# Patient Record
Sex: MALE | Race: BLACK OR AFRICAN AMERICAN | NOT HISPANIC OR LATINO | Employment: FULL TIME | URBAN - NONMETROPOLITAN AREA
[De-identification: names, ages, dates, MRNs, and addresses within clinical notes are randomized per-mention and may not be internally consistent; named-entity substitution may affect disease eponyms.]

---

## 2017-03-04 ENCOUNTER — COMMUNICATION - GICH (OUTPATIENT)
Dept: FAMILY MEDICINE | Facility: OTHER | Age: 42
End: 2017-03-04

## 2017-03-04 DIAGNOSIS — I10 ESSENTIAL (PRIMARY) HYPERTENSION: ICD-10-CM

## 2017-08-31 ENCOUNTER — COMMUNICATION - GICH (OUTPATIENT)
Dept: FAMILY MEDICINE | Facility: OTHER | Age: 42
End: 2017-08-31

## 2017-08-31 DIAGNOSIS — I10 ESSENTIAL (PRIMARY) HYPERTENSION: ICD-10-CM

## 2017-09-07 ENCOUNTER — COMMUNICATION - GICH (OUTPATIENT)
Dept: FAMILY MEDICINE | Facility: OTHER | Age: 42
End: 2017-09-07

## 2017-09-07 DIAGNOSIS — I10 ESSENTIAL (PRIMARY) HYPERTENSION: ICD-10-CM

## 2017-09-12 ENCOUNTER — COMMUNICATION - GICH (OUTPATIENT)
Dept: FAMILY MEDICINE | Facility: OTHER | Age: 42
End: 2017-09-12

## 2017-09-12 DIAGNOSIS — I10 ESSENTIAL (PRIMARY) HYPERTENSION: ICD-10-CM

## 2017-09-20 ENCOUNTER — OFFICE VISIT - GICH (OUTPATIENT)
Dept: FAMILY MEDICINE | Facility: OTHER | Age: 42
End: 2017-09-20

## 2017-09-20 ENCOUNTER — HISTORY (OUTPATIENT)
Dept: FAMILY MEDICINE | Facility: OTHER | Age: 42
End: 2017-09-20

## 2017-09-20 DIAGNOSIS — E66.3 OVERWEIGHT: ICD-10-CM

## 2017-09-20 DIAGNOSIS — I10 ESSENTIAL (PRIMARY) HYPERTENSION: ICD-10-CM

## 2017-09-20 DIAGNOSIS — Z00.00 ENCOUNTER FOR GENERAL ADULT MEDICAL EXAMINATION WITHOUT ABNORMAL FINDINGS: ICD-10-CM

## 2017-09-20 DIAGNOSIS — Z13.220 ENCOUNTER FOR SCREENING FOR LIPOID DISORDERS: ICD-10-CM

## 2017-09-20 LAB
ANION GAP - HISTORICAL: 10 (ref 5–18)
BUN SERPL-MCNC: 12 MG/DL (ref 7–25)
BUN/CREAT RATIO - HISTORICAL: 11
CALCIUM SERPL-MCNC: 9.8 MG/DL (ref 8.6–10.3)
CHLORIDE SERPLBLD-SCNC: 98 MMOL/L (ref 98–107)
CHOL/HDL RATIO - HISTORICAL: 4.68
CHOLESTEROL TOTAL: 234 MG/DL
CO2 SERPL-SCNC: 28 MMOL/L (ref 21–31)
CREAT SERPL-MCNC: 1.1 MG/DL (ref 0.7–1.3)
GFR IF NOT AFRICAN AMERICAN - HISTORICAL: >60 ML/MIN/1.73M2
GLUCOSE SERPL-MCNC: 107 MG/DL (ref 70–105)
HDLC SERPL-MCNC: 50 MG/DL (ref 23–92)
LDLC SERPL CALC-MCNC: 159 MG/DL
NON-HDL CHOLESTEROL - HISTORICAL: 184 MG/DL
POTASSIUM SERPL-SCNC: 3.6 MMOL/L (ref 3.5–5.1)
PROVIDER ORDERDED STATUS - HISTORICAL: ABNORMAL
SODIUM SERPL-SCNC: 136 MMOL/L (ref 133–143)
TRIGL SERPL-MCNC: 124 MG/DL

## 2017-12-27 NOTE — PROGRESS NOTES
Patient Information     Patient Name MRN Sex Andrews Hernandez [id number] Male 1975      Progress Notes by Bhaskar Kirby MD at 2017  1:00 PM     Author:  Bhaskar Kirby MD Service:  (none) Author Type:  Physician     Filed:  2017  1:35 PM Encounter Date:  2017 Status:  Signed     :  Bhaskar Kirby MD (Physician)            SUBJECTIVE:    Andrews Jansen is a 41 y.o. male who presents for a general physical exam and follow-up on hypertension.    HPI: Patient has a history of hypertension maintained on Zestoretic and amlodipine. He has been working on being more physically active and is starting to lose some weight. He has some intermittent knee pain which he attributes to being overweight otherwise has been feeling well.    PROBLEM LIST:  Patient Active Problem List     Diagnosis  Code     Hypertension I10     Insomnia G47.00     PAST MEDICAL HISTORY:No past medical history on file.  SURGICAL HISTORY:  No past surgical history on file.    SOCIAL HISTORY:  Social History     Social History        Marital status:  Single     Spouse name: N/A     Number of children:  N/A     Years of education:  N/A     Occupational History      Not on file.     Social History Main Topics          Smoking status:   Current Some Day Smoker      Types:  Cigarettes      Smokeless tobacco:   Never Used      Alcohol use   Yes      Comment: rare       Drug use:   No      Sexual activity:   Not on file      Other Topics  Concern     Seat Belt Yes     Social History Narrative     . Two children.  at [employer].             FAMILY HISTORY:  Family History      Problem  Relation Age of Onset     Brain cancer Mother      Hypertension Mother      Unknown Father      Good Health Sister      Congenital heart disease Brother      Good Health Brother      Good Health Brother      Good Health Brother      Good Health Brother      Good Health Brother      Good Health Sister       CURRENT  "MEDICATIONS:   Current Outpatient Prescriptions       Medication  Sig Dispense Refill     amLODIPine (NORVASC) 10 mg tablet Take 1 tablet by mouth once daily. 90 tablet 3     lisinopril-hydrochlorothiazide, 20-25 mg, (PRINZIDE, ZESTORETIC) 20-25 mg per tablet Take 1 tablet by mouth once daily. 90 tablet 3     No current facility-administered medications for this visit.      Medications have been reviewed by me and are current to the best of my knowledge and ability.    ALLERGIES:  Review of patient's allergies indicates no known allergies.    REVIEW OF SYSTEMS:  General: denies any general problems.  Eyes: denies problems  Ears/Nose/Throat: denies problems  Cardiovascular: denies problems  Respiratory: denies problems  Gastrointestinal: denies problems  Genitourinary: denies problems  Musculoskeletal: see HPI  Skin: denies problems  Neurologic: denies problems  Psychiatric: denies problems  Endocrine: denies problems  Heme/Lymphatic: denies problems  Allergic/Immunologic: denies problems  PHQ Depression Screening 9/20/2017   Date of PHQ exam (doc flow) 9/20/2017   1. Lack of interest/pleasure 0 - Not at all   2. Feeling down/depressed 0 - Not at all   PHQ-2 TOTAL SCORE 0   3. Trouble sleeping -   4. Decreased energy -   5. Appetite change -   6. Feelings of failure -   7. Trouble concentrating -   8. Activity level -   9. Hurting yourself -   PHQ-9 TOTAL SCORE -   PHQ-9 Severity Level -   Functional Impairment -   Some recent data might be hidden       OBJECTIVE:  /80  Pulse 68  Ht 1.791 m (5' 10.5\")  Wt 126.3 kg (278 lb 6 oz)  BMI 39.38 kg/m2  EXAM:   General Appearance: Pleasant, alert, appropriate appearance for age. No acute distress  Head Exam: Normal. Normocephalic, atraumatic.  Eye Exam:  Normal external eye, conjunctiva, lids, cornea. TOMMY.  Ear Exam: Normal TM's bilaterally. Normal auditory canals and external ears. Non-tender.  Nose Exam: Normal external nose, mucus membranes, and " septum.  OroPharynx Exam: Patient has had multiple dental extractions with current evidence of ongoing caries.  Neck Exam:  Supple, no masses or nodes.  Thyroid Exam: No nodules or enlargement.  Chest/Respiratory Exam: Normal chest wall and respirations. Clear to auscultation.  Cardiovascular Exam: Regular rate and rhythm. S1, S2, no murmur, click, gallop, or rubs.  Gastrointestinal Exam: Soft, non-tender, no masses or organomegaly.  Genitourinary Exam Male: Normal male genitalia. No discharge or penile ulcerations. No testicular masses or swelling.  Lymphatic Exam: Non-palpable nodes in neck, clavicular, axillary, or inguinal regions.  Musculoskeletal Exam: Back is straight and non-tender, full ROM of upper and lower extremities.  Foot Exam: Left and right foot: good pedal pulses, no lesions, nail hygiene good.  Skin: no rash or abnormalities  Neurologic Exam: Nonfocal, symmetric DTRs, normal gross motor, tone coordination and no tremor.  Psychiatric Exam: Alert and oriented - appropriate affect.    ASSESSMENT/PLAN:    ICD-10-CM    1. Health care maintenance  Immunizations reviewed. Patient believes he had a tetanus booster 6 or 7 years ago.  Z00.00    2. Hypertension  Blood pressure at goal. Continue current medications. Basic metabolic panel drawn today.  I10 amLODIPine (NORVASC) 10 mg tablet      lisinopril-hydrochlorothiazide, 20-25 mg, (PRINZIDE, ZESTORETIC) 20-25 mg per tablet      BASIC METABOLIC PANEL      BASIC METABOLIC PANEL   3. Overweight  Congratulated on efforts to lose weight.  E66.3    4. Lipid screening  Lipid panel drawn today.  Z13.220 LIPID PANEL      LIPID PANEL       Bhaskar Kirby MD

## 2017-12-28 NOTE — TELEPHONE ENCOUNTER
Patient Information     Patient Name MRN Andrews Hernandes [id number] Male 1975      Telephone Encounter by Angelica Tolbert RN at 2017  3:17 PM     Author:  Angelica Tolbert RN Service:  (none) Author Type:  NURS- Registered Nurse     Filed:  2017  3:19 PM Encounter Date:  2017 Status:  Signed     :  Angelica Tolbert RN (NURS- Registered Nurse)            Request already responded to. Patient has upcoming appointment to address additional refills.    Angelica Tolbert RN........2017 3:18 PM

## 2017-12-28 NOTE — TELEPHONE ENCOUNTER
Patient Information     Patient Name MRN Sex nAdrews Hernandez [id number] Male 1975      Telephone Encounter by Angelica Tolbert RN at 2017  2:49 PM     Author:  Angelica Tolbert RN Service:  (none) Author Type:  NURS- Registered Nurse     Filed:  2017  2:57 PM Encounter Date:  2017 Status:  Signed     :  Angelica Tolbert RN (NURS- Registered Nurse)            Patient is overdue for physical and labs. Letter was sent with last refill. Call placed to patient. Left message to call back  ....................Angelica Tolbert RN  2017   2:57 PM

## 2017-12-28 NOTE — TELEPHONE ENCOUNTER
Patient Information     Patient Name MRN Sex Andrews Hernandez [id number] Male 1975      Telephone Encounter by Angelica Tolbert RN at 2017  3:40 PM     Author:  Angelica Tolbert RN Service:  (none) Author Type:  NURS- Registered Nurse     Filed:  2017  3:42 PM Encounter Date:  2017 Status:  Signed     :  Angelica Tolbert RN (NURS- Registered Nurse)            Patient returned phone call and was transferred to scheduling.    Diuretic Combinations    Office visit in the past 12 months or per provider note.    Last visit with ZAID KEATING was on: 2016 in [facility]  Next visit with ZAID KEATING is on: No future appointment listed with this provider  Next visit with Family Practice is on: No future appointment listed in this department    Lab test requirements:  Creatinine and Potassium annually, if ordering lab, order BMP.  CREATININE (mg/dL)    Date Value   2015 0.90     POTASSIUM (mmol/L)    Date Value   2015 4.0       Max refill for 12 months from last office visit or per provider note.    Prescription refilled per RN Medication Refill Policy.................... Angelica oTlbert RN ....................  2017   3:41 PM

## 2017-12-28 NOTE — TELEPHONE ENCOUNTER
Patient Information     Patient Name MRN Andrews Hernandes [id number] Male 1975      Telephone Encounter by Angelica Tolbert RN at 2017 12:23 PM     Author:  Angelica Tolbert RN Service:  (none) Author Type:  NURS- Registered Nurse     Filed:  2017 12:25 PM Encounter Date:  2017 Status:  Signed     :  Angelica Tolbert RN (NURS- Registered Nurse)            Calcium Channel Blockers    Office visit in the past 12 months or per provider note.    Last visit with ZAID KEATING was on: 2016 in [facility]  Next visit with ZAID KEATING is on: 2017 in [facility]  Next visit with Family Practice is on: 2017 in [facility]  BP Readings from Last 4 Encounters:    16 122/78   11/16/15 146/90   08/06/15 146/90   14 136/88       Review last provider visit note.  If BP reviewed and plan is noted, can refill.  Max refill for 12 months from last office visit or per provider note.    Patient is due for medication management appointment. Limited refill provided at this timeand noted upcoming appointment. Prescription refilled per RN Medication Refill Policy.................... Angelica Tolbert RN ....................  2017   12:23 PM

## 2018-01-03 NOTE — TELEPHONE ENCOUNTER
Patient Information     Patient Name MRN Sex Andrews Hernandez [id number] Male 1975      Telephone Encounter by Angelica Tolbert RN at 3/6/2017  8:31 AM     Author:  Angelica Tolbert RN Service:  (none) Author Type:  NURS- Registered Nurse     Filed:  3/6/2017  8:31 AM Encounter Date:  3/4/2017 Status:  Signed     :  Angelica Tolbert RN (NURS- Registered Nurse)            Calcium Channel Blockers    Office visit in the past 12 months or per provider note.    Last visit with ZAID KEATING was on: 2016 in [facility]  Next visit with ZAID KEATING is on: No future appointment listed with this provider  Next visit with Family Practice is on: No future appointment listed in this department  BP Readings from Last 4 Encounters:    16 122/78   11/16/15 146/90   08/06/15 146/90   14 136/88       Review last provider visit note.  If BP reviewed and plan is noted, can refill.  Max refill for 12 months from last office visit or per provider note.    Patient is due for medication management appointment. Limited refill provided at this time and letter sent for reminder to patient. Prescription refilled per RN Medication Refill Policy.................... Angelica Tolbert RN ....................  3/6/2017   8:31 AM

## 2018-01-27 VITALS
BODY MASS INDEX: 38.97 KG/M2 | SYSTOLIC BLOOD PRESSURE: 136 MMHG | HEIGHT: 71 IN | HEART RATE: 68 BPM | WEIGHT: 278.38 LBS | DIASTOLIC BLOOD PRESSURE: 80 MMHG

## 2018-02-16 ENCOUNTER — DOCUMENTATION ONLY (OUTPATIENT)
Dept: FAMILY MEDICINE | Facility: OTHER | Age: 43
End: 2018-02-16

## 2018-02-16 PROBLEM — E66.3 OVERWEIGHT: Status: ACTIVE | Noted: 2017-09-20

## 2018-02-16 RX ORDER — LISINOPRIL AND HYDROCHLOROTHIAZIDE 20; 25 MG/1; MG/1
1 TABLET ORAL DAILY
COMMUNITY
Start: 2017-09-20 | End: 2018-10-18

## 2018-02-16 RX ORDER — AMLODIPINE BESYLATE 10 MG/1
10 TABLET ORAL DAILY
COMMUNITY
Start: 2017-09-20 | End: 2018-10-18

## 2018-07-23 NOTE — PROGRESS NOTES
Patient Information     Patient Name  Andrews Jansen MRN  [id number] Sex  Male   1975      Letter by Bhaskar Kirby MD at      Author:  Bhaskar Kirby MD Service:  (none) Author Type:  (none)    Filed:   Encounter Date:  2017 Status:  (Other)       Andrews Jansen  [address] [facility] [address]    2017      Dear Mr. Jansen,      Please find enclosed a copy of your recent laboratory studies.    Your lipid panel was normal except for a high LDL cholesterol of 159. Your basic metabolic panel showed a slightly elevated blood glucose likely due to not being fasting.      Results for orders placed or performed in visit on 17      LIPID PANEL      Result  Value Ref Range    CHOLESTEROL,TOTAL 234 (H) <200 mg/dL    TRIGLYCERIDES 124 <150 mg/dL    HDL CHOLESTEROL 50 23 - 92 mg/dL    NON-HDL CHOLESTEROL 184 (H) <145 mg/dl    CHOL/HDL RATIO            4.68 (H) <4.50                    LDL CHOLESTEROL 159 (H) <100 mg/dL    PROVIDER ORDERED STATUS RANDOM    BASIC METABOLIC PANEL      Result  Value Ref Range    SODIUM 136 133 - 143 mmol/L    POTASSIUM 3.6 3.5 - 5.1 mmol/L    CHLORIDE 98 98 - 107 mmol/L    CO2,TOTAL 28 21 - 31 mmol/L    ANION GAP 10 5 - 18                    GLUCOSE 107 (H) 70 - 105 mg/dL    CALCIUM 9.8 8.6 - 10.3 mg/dL    BUN 12 7 - 25 mg/dL    CREATININE 1.10 0.70 - 1.30 mg/dL    BUN/CREAT RATIO           11                    GFR if African American >60 >60 ml/min/1.73m2    GFR if not African American >60 >60 ml/min/1.73m2       It was a pleasure seeing you recently in the clinic.  If you have any questions regarding these results, please feel free to call.      Sincerely,       Bhaskar Kirby MD

## 2018-07-23 NOTE — PROGRESS NOTES
Patient Information     Patient Name  Andrews Jansen MRN  [id number] Sex  Male   1975      Letter by Bhaskar Kirby MD at      Author:  Bhaskar Kirby MD Service:  (none) Author Type:  (none)    Filed:   Encounter Date:  3/4/2017 Status:  (Other)           Andrews Jansen  [address] [facility] [id number]          2017    Dear Mr. Jansen:    This letter is to remind you that you are due for your annual exam with Bhaskar Kirby MD. Your last comprehensive visit was more than 12 months ago.    A LIMITED refill of amLODIPine (NORVASC) 10 mg tablet has been called into your pharmacy. Additional refills require you to complete this appointment.    Please call the clinic at [phone] to schedule your appointment.    Thank you for choosing [facility] for your health care needs.    Sincerely,    Refill RN  [facility]

## 2019-04-26 ENCOUNTER — OFFICE VISIT (OUTPATIENT)
Dept: FAMILY MEDICINE | Facility: OTHER | Age: 44
End: 2019-04-26
Attending: NURSE PRACTITIONER
Payer: COMMERCIAL

## 2019-04-26 VITALS
SYSTOLIC BLOOD PRESSURE: 146 MMHG | TEMPERATURE: 97.3 F | WEIGHT: 300.7 LBS | HEIGHT: 71 IN | DIASTOLIC BLOOD PRESSURE: 88 MMHG | RESPIRATION RATE: 16 BRPM | BODY MASS INDEX: 42.1 KG/M2 | HEART RATE: 76 BPM

## 2019-04-26 DIAGNOSIS — M54.41 ACUTE RIGHT-SIDED LOW BACK PAIN WITH RIGHT-SIDED SCIATICA: Primary | ICD-10-CM

## 2019-04-26 PROCEDURE — 99214 OFFICE O/P EST MOD 30 MIN: CPT | Performed by: NURSE PRACTITIONER

## 2019-04-26 RX ORDER — KETOROLAC TROMETHAMINE 10 MG/1
10 TABLET, FILM COATED ORAL EVERY 6 HOURS PRN
Qty: 20 TABLET | Refills: 0 | Status: SHIPPED | OUTPATIENT
Start: 2019-04-26 | End: 2021-11-27

## 2019-04-26 RX ORDER — ORPHENADRINE CITRATE 100 MG/1
100 TABLET, EXTENDED RELEASE ORAL 2 TIMES DAILY
Qty: 10 TABLET | Refills: 0 | Status: SHIPPED | OUTPATIENT
Start: 2019-04-26 | End: 2021-11-27

## 2019-04-26 ASSESSMENT — ENCOUNTER SYMPTOMS
BACK PAIN: 1
CONSTITUTIONAL NEGATIVE: 1
GASTROINTESTINAL NEGATIVE: 1
NECK PAIN: 0
JOINT SWELLING: 0

## 2019-04-26 ASSESSMENT — PAIN SCALES - GENERAL: PAINLEVEL: MODERATE PAIN (5)

## 2019-04-26 ASSESSMENT — MIFFLIN-ST. JEOR: SCORE: 2291.1

## 2019-04-26 NOTE — NURSING NOTE
"Chief Complaint   Patient presents with     Musculoskeletal Problem   Pt present to clinic today for right hip pain that radiates down his leg that started [facility] [employer]    Initial /88 (BP Location: Left arm, Patient Position: Sitting, Cuff Size: Adult Large)   Pulse 76   Temp 97.3  F (36.3  C) (Tympanic)   Resp 16   Ht 1.803 m (5' 11\")   Wt 136.4 kg (300 lb 11.2 oz)   BMI 41.94 kg/m   Estimated body mass index is 41.94 kg/m  as calculated from the following:    Height as of this encounter: 1.803 m (5' 11\").    Weight as of this encounter: 136.4 kg (300 lb 11.2 oz).  Medication Reconciliation: complete    Le Holt LPN  "

## 2019-04-26 NOTE — PATIENT INSTRUCTIONS
"Patient Education     Follow up with a chiropractor - Joaquín Weathers, [facility] or [facility] are all good options.   Ice the area and take medications as discussed.       * Sciatica    Sciatica (\"Lumbar Radiculopathy\") causes a pain that spreads from the lower back down into the buttock, hip and leg. Sometimes leg pain can occur without any back pain. Sciatica is due to irritation or pressure on a spinal nerve as it comes out of the spinal canal. This is most often due to pressure on the nerve from a nearby spinal disk (the cartilage cushion between each spinal bone). Other causes include spinal stenosis (narrowing of the spinal canal) and spasm of the piriformis muscle (a muscle in the buttocks that the sciatic nerve passes through).  Sciatica may begin after a sudden twisting/bending force (such as in a car accident), or sometimes after a simple awkward movement. In either case, muscle spasm is commonly present and can add to the pain.  The diagnosis of sciatica is made from the symptoms and physical exam. Unless you had a physical injury (such as a car accident or fall), X-rays are usually not ordered for the initial evaluation of sciatica because the nerves and disks cannot be seen on an X-ray. Most sciatica (80-90%) gets better with time.  What can I do about my low back pain?  There are three main things you can do to ease low back pain and help it go away.    Stay active! Use positions, movements and exercises. Too much rest can make your symptoms worse.    Use heat or cold packs.    Take medicine as directed.  Using positions, movements and exercises  Research tells us that moving your joints and muscles can help you recover from back pain. Such activity should be simple and gentle.  Use walking to help relieve your discomfort. Try taking a short walk every 3 to 4 hours during the day. Walk for a few minutes inside your home or take longer walks outside, on a treadmill or at a mall. " Slowly increase the amount of time you walk. Expect discomfort when you begin, but it should lessen as your back starts to recover.  Finding a position that is comfortable  When your back pain is new, you may find that certain positions will ease your pain. Gently try each of the following positions until you find one that eases your pain. Once you find a position of comfort, use it as often as you like while you recover. Return to your daily routine as soon as possible.     Lie on your back with your legs bent. You can do this by placing a pillow under your knees or lie on the floor and rest your lower legs on the seat of a chair.    Lie on your side with your knees bent and place a pillow between your knees.    Lie on your stomach over pillows.  Using heat or cold packs  Try cold packs or gentle heat to ease your pain. Use whichever gives you the most relief. Apply the cold pack or heat for 15 minutes at a time, as often as needed.  Taking medicine  If taking over-the-counter medicine:    Take ibuprofen (Advil, Motrin) 600 mg. three times a day as needed for pain.  OR    Take Aleve (naproxen sodium) 220 to 440 mg. two times a day as needed for pain.  If your doctor prescribed medicine, follow the instructions. Stop taking the medicine as soon as you can.  When should I call my doctor?  Your back pain should improve over the first couple of weeks. As it improves, you should be able to return to your normal activities. But call your doctor if:    You have a sudden change in your ability to control? your bladder or bowels.    You begin to feel tingling in your groin or legs.    The pain spreads down your leg and into your foot.    Your toes, feet or leg muscles begin to feel weak.    You feel generally unwell or sick.    Your pain gets worse.    8629-2842 [employer]. [address] [place] [address], [place] [address]. All rights reserved. This information is not intended as a substitute for professional medical  care. Always follow your healthcare professional's instructions.  This information has been modified by your health care provider with permission from the publisher.

## 2019-04-26 NOTE — PROGRESS NOTES
SUBJECTIVE:   Andrews Jansen is a 43 year old male who presents to clinic today for the following health issues:    HPI  Presents with low right back pain radiating in to the hip after lifting weights (squats) 4 days ago. Pain is sharp going down his leg. Standing increases after he's been standing for a few minutes. Sleeping is difficult as well. Resting and putting a pillow between his legs does reduce his pain. No bowel or bladder issues. Has taken advil and cheikh for pain which helps somewhat.     Patient Active Problem List    Diagnosis Date Noted     Overweight 09/20/2017     Priority: Medium     Insomnia 02/17/2016     Priority: Medium     Hypertension 07/23/2013     Priority: Medium     History reviewed. No pertinent past medical history.   History reviewed. No pertinent surgical history.  Family History   Problem Relation Age of Onset     Brain Cancer Mother         Brain cancer     Hypertension Mother         Hypertension     Unknown/Adopted Father         Unknown     Family History Negative Sister         Good Health     Other - See Comments Brother         Congenital heart disease     Family History Negative Brother         Good Health     Family History Negative Brother         Good Health     Family History Negative Brother         Good Health     Family History Negative Brother         Good Health     Family History Negative Brother         Good Health     Family History Negative Sister         Good Health     Social History     Tobacco Use     Smoking status: Current Some Day Smoker     Types: Cigarettes     Smokeless tobacco: Never Used   Substance Use Topics     Alcohol use: Yes     Comment: Alcoholic Drinks/day: rare     Social History     Social History Narrative    . Two children.  at [employer].     Current Outpatient Medications   Medication Sig Dispense Refill     amLODIPine (NORVASC) 10 MG tablet Take 1 tablet (10 mg) by mouth daily 30 tablet 0     ketorolac  "(TORADOL) 10 MG tablet Take 1 tablet (10 mg) by mouth every 6 hours as needed for moderate pain 20 tablet 0     lisinopril-hydrochlorothiazide (PRINZIDE/ZESTORETIC) 20-25 MG per tablet Take 1 tablet by mouth daily 30 tablet 0     orphenadrine ER (NORFLEX) 100 MG 12 hr tablet Take 1 tablet (100 mg) by mouth 2 times daily 10 tablet 0     No Known Allergies    Review of Systems   Constitutional: Negative.    Gastrointestinal: Negative.    Genitourinary: Negative.    Musculoskeletal: Positive for back pain. Negative for joint swelling and neck pain.   Skin: Negative.         OBJECTIVE:     /88 (BP Location: Left arm, Patient Position: Sitting, Cuff Size: Adult Large)   Pulse 76   Temp 97.3  F (36.3  C) (Tympanic)   Resp 16   Ht 1.803 m (5' 11\")   Wt 136.4 kg (300 lb 11.2 oz)   BMI 41.94 kg/m    Body mass index is 41.94 kg/m .  Physical Exam   Constitutional: He is oriented to person, place, and time. He appears well-developed and well-nourished. No distress.   HENT:   Head: Normocephalic and atraumatic.   Eyes: Conjunctivae are normal. No scleral icterus.   Neck: Normal range of motion.   Cardiovascular: Normal rate.   Pulmonary/Chest: Effort normal.   Musculoskeletal: Normal range of motion.   Back exam:  Skin intact no ecchymosis, erythema or edema noted.  AFROM, able to walk on heels and toes.  N/M/V intact.  BLE strength 5/5. Neck has full ROM with no pain. No red flags noted.   Neurological: He is alert and oriented to person, place, and time.   Skin: Skin is warm and dry. He is not diaphoretic.   Psychiatric: He has a normal mood and affect. His behavior is normal.   Nursing note and vitals reviewed.      Diagnostic Test Results:  No results found for this or any previous visit (from the past 24 hour(s)).    ASSESSMENT/PLAN:       ICD-10-CM    1. Acute right-sided low back pain with right-sided sciatica M54.41 ketorolac (TORADOL) 10 MG tablet     orphenadrine ER (NORFLEX) 100 MG 12 hr tablet     Plan: " No red flags noted. Pt was given a prescription for NSAID and muscle relaxer, made aware of the side effects. Referred to chiro.   Pt was told to ice the area and rest when able.   I explained my diagnostic considerations and recommendations to the patient, who voiced understanding and agreement with the treatment plan.   All questions were answered. We discussed expectations for response to treatments. Advised to contact PCP for further management, if there is no improvement or worsening of conditions or symptoms.    Disclaimer:  This note consists of words and symbols derived from keyboarding, dictation, or using voice recognition software. As a result, there may be errors in the script that have gone undetected. Please consider this when interpreting information found in this note.      SINDHU Szymanski, NP-C  4/26/2019 at 10:17 AM  [facility]

## 2021-01-20 ENCOUNTER — IMMUNIZATION (OUTPATIENT)
Dept: FAMILY MEDICINE | Facility: OTHER | Age: 46
End: 2021-01-20
Attending: FAMILY MEDICINE
Payer: COMMERCIAL

## 2021-01-20 PROCEDURE — 91300 PR COVID VAC PFIZER DIL RECON 30 MCG/0.3 ML IM: CPT

## 2021-01-20 PROCEDURE — 0001A PR COVID VAC PFIZER DIL RECON 30 MCG/0.3 ML IM: CPT

## 2021-02-10 ENCOUNTER — IMMUNIZATION (OUTPATIENT)
Dept: FAMILY MEDICINE | Facility: OTHER | Age: 46
End: 2021-02-10
Attending: FAMILY MEDICINE
Payer: COMMERCIAL

## 2021-02-10 PROCEDURE — 0002A PR COVID VAC PFIZER DIL RECON 30 MCG/0.3 ML IM: CPT

## 2021-02-10 PROCEDURE — 91300 PR COVID VAC PFIZER DIL RECON 30 MCG/0.3 ML IM: CPT

## 2021-11-27 ENCOUNTER — HOSPITAL ENCOUNTER (EMERGENCY)
Facility: OTHER | Age: 46
Discharge: HOME | End: 2021-11-27
Attending: PHYSICIAN ASSISTANT | Admitting: PHYSICIAN ASSISTANT
Payer: COMMERCIAL

## 2021-11-27 VITALS
DIASTOLIC BLOOD PRESSURE: 132 MMHG | HEIGHT: 71 IN | OXYGEN SATURATION: 99 % | WEIGHT: 270 LBS | SYSTOLIC BLOOD PRESSURE: 185 MMHG | BODY MASS INDEX: 37.8 KG/M2 | HEART RATE: 86 BPM | TEMPERATURE: 98.1 F | RESPIRATION RATE: 16 BRPM

## 2021-11-27 DIAGNOSIS — Z91.148 NON COMPLIANCE W MEDICATION REGIMEN: ICD-10-CM

## 2021-11-27 DIAGNOSIS — I15.9 SECONDARY HYPERTENSION: ICD-10-CM

## 2021-11-27 DIAGNOSIS — K02.9 DENTAL CARIES: ICD-10-CM

## 2021-11-27 DIAGNOSIS — I10 HYPERTENSION, UNSPECIFIED TYPE: ICD-10-CM

## 2021-11-27 PROCEDURE — 99282 EMERGENCY DEPT VISIT SF MDM: CPT | Performed by: PHYSICIAN ASSISTANT

## 2021-11-27 PROCEDURE — 250N000013 HC RX MED GY IP 250 OP 250 PS 637: Performed by: PHYSICIAN ASSISTANT

## 2021-11-27 PROCEDURE — 99283 EMERGENCY DEPT VISIT LOW MDM: CPT | Performed by: PHYSICIAN ASSISTANT

## 2021-11-27 RX ORDER — CLINDAMYCIN HCL 300 MG
300 CAPSULE ORAL 4 TIMES DAILY
Qty: 40 CAPSULE | Refills: 0 | Status: SHIPPED | OUTPATIENT
Start: 2021-11-27 | End: 2021-12-07

## 2021-11-27 RX ORDER — TRAMADOL HYDROCHLORIDE 50 MG/1
50 TABLET ORAL EVERY 6 HOURS PRN
Qty: 15 TABLET | Refills: 0 | Status: SHIPPED | OUTPATIENT
Start: 2021-11-27 | End: 2023-01-19

## 2021-11-27 RX ORDER — LISINOPRIL 20 MG/1
20 TABLET ORAL DAILY
Status: DISCONTINUED | OUTPATIENT
Start: 2021-11-27 | End: 2021-11-27 | Stop reason: HOSPADM

## 2021-11-27 RX ORDER — CLINDAMYCIN HCL 150 MG
300 CAPSULE ORAL ONCE
Status: COMPLETED | OUTPATIENT
Start: 2021-11-27 | End: 2021-11-27

## 2021-11-27 RX ORDER — HYDROCHLOROTHIAZIDE 25 MG/1
25 TABLET ORAL DAILY
Status: DISCONTINUED | OUTPATIENT
Start: 2021-11-27 | End: 2021-11-27 | Stop reason: HOSPADM

## 2021-11-27 RX ORDER — AMLODIPINE BESYLATE 10 MG/1
10 TABLET ORAL DAILY
Qty: 30 TABLET | Refills: 0 | Status: SHIPPED | OUTPATIENT
Start: 2021-11-27 | End: 2021-12-29

## 2021-11-27 RX ORDER — AMLODIPINE BESYLATE 5 MG/1
10 TABLET ORAL ONCE
Status: COMPLETED | OUTPATIENT
Start: 2021-11-27 | End: 2021-11-27

## 2021-11-27 RX ORDER — LISINOPRIL AND HYDROCHLOROTHIAZIDE 20; 25 MG/1; MG/1
1 TABLET ORAL DAILY
Qty: 30 TABLET | Refills: 0 | Status: SHIPPED | OUTPATIENT
Start: 2021-11-27 | End: 2021-12-29

## 2021-11-27 RX ADMIN — CLINDAMYCIN HYDROCHLORIDE 300 MG: 150 CAPSULE ORAL at 19:09

## 2021-11-27 RX ADMIN — LISINOPRIL 20 MG: 20 TABLET ORAL at 19:09

## 2021-11-27 RX ADMIN — HYDROCHLOROTHIAZIDE 25 MG: 25 TABLET ORAL at 19:09

## 2021-11-27 RX ADMIN — AMLODIPINE BESYLATE 10 MG: 5 TABLET ORAL at 19:09

## 2021-11-27 ASSESSMENT — MIFFLIN-ST. JEOR: SCORE: 2136.84

## 2021-11-28 NOTE — ED PROVIDER NOTES
History     Chief Complaint   Patient presents with     Facial Swelling     HPI  Andrews Jansen is a 46 year old male who presents to the emergency department for evaluation has been noncompliant with taking his medication has not taken his antihypertensives for about a year or so he tells me.  Blood pressure is high he is here because he does have some swelling to the right upper maxillary region of his face.  He has poor dentition.  He has not seen a dentist.  He complains of mild discomfort when chewing and eating.  He is handling secretions [place] no neck pain chest pain shortness of breath abdominal pain diarrhea constipation no loss bowel bladder function rashes he does not have any trauma or other concerns at this time.    Allergies:  No Known Allergies    Problem List:    Patient Active Problem List    Diagnosis Date Noted     Overweight 09/20/2017     Priority: Medium     Insomnia 02/17/2016     Priority: Medium     Hypertension 07/23/2013     Priority: Medium        Past Medical History:    History reviewed. No pertinent past medical history.    Past Surgical History:    History reviewed. No pertinent surgical history.    Family History:    Family History   Problem Relation Age of Onset     Brain Cancer Mother         Brain cancer     Hypertension Mother         Hypertension     Unknown/Adopted Father         Unknown     Family History Negative Sister         Good Health     Other - See Comments Brother         Congenital heart disease     Family History Negative Brother         Good Health     Family History Negative Brother         Good Health     Family History Negative Brother         Good Health     Family History Negative Brother         Good Health     Family History Negative Brother         Good Health     Family History Negative Sister         Good Health       Social History:  Marital Status:  Single [1]  Social History     Tobacco Use     Smoking status: Current Some Day Smoker     Types:  "Cigarettes     Smokeless tobacco: Never Used   Substance Use Topics     Alcohol use: Yes     Comment: Alcoholic Drinks/day: rare     Drug use: Unknown     Types: Other     Comment: Drug use: No        Medications:    amLODIPine (NORVASC) 10 MG tablet  clindamycin (CLEOCIN) 300 MG capsule  lisinopril-hydrochlorothiazide (ZESTORETIC) 20-25 MG tablet  traMADol (ULTRAM) 50 MG tablet          Review of Systems   Please see HPI for pertinent positives and negatives.  All other systems reviewed and found to be negative.      Physical Exam   BP: (!) 229/125  Pulse: 86  Temp: 98.1  F (36.7  C)  Resp: 16  Height: 180.3 cm (5' 11\")  Weight: 122.5 kg (270 lb)  SpO2: 99 %      Physical Exam   Exam:  Constitutional: healthy, alert and no distress  Head: Normocephalic.   Neck: Neck supple.    ENT: Oropharynx examination reveals that he does have some swelling to the maxillary region on the right-hand side and the buccal gutter.  I do not feel a palpable abscess there is no fluctuance.  He does have poor dentition.  Cannot rule out periapical abscess.  At this time there is no drainable fluid collection.  Cardiovascular: . RRR. No murmurs, clicks gallops or rub  Respiratory:   Lungs clear  Gastrointestinal: Abdomen soft, non-tender. BS normal. No masses, organomegaly  : Deferred  Musculoskeletal: extremities normal- no gross deformities noted, gait normal and normal muscle tone  Skin: no suspicious lesions or rashes  Neurologic: Gait normal. Reflexes normal and symmetric. Sensation grossly WNL.  Psychiatric: mentation appears normal and affect normal/bright         ED Course        Procedures            No results found for this or any previous visit (from the past 24 hour(s)).    Medications   lisinopril (ZESTRIL) tablet 20 mg (20 mg Oral Given 11/27/21 1909)   hydrochlorothiazide (HYDRODIURIL) tablet 25 mg (25 mg Oral Given 11/27/21 1909)   amLODIPine (NORVASC) tablet 10 mg (10 mg Oral Given 11/27/21 1909)   clindamycin " (CLEOCIN) capsule 300 mg (300 mg Oral Given 11/27/21 1909)       Assessments & Plan (with Medical Decision Making)     I have reviewed the nursing notes.    I have reviewed the findings, diagnosis, plan and need for follow up with the patient.. Pleasant gentleman who presents today for further evaluation with some facial swelling concerning for a facial cellulitis at this time there is no palpable abscess consistent with a dental abscess but cannot rule out periapical abscess. There is no drainable fluid collection at this time that is palpable.  I did not feel comfortable trying to open this up with a 11 blade as this could cause further swelling.  Surely if there was a palpable abscess and I&D would be indicated but at this time I would encourage him antibiotics close follow-up he understands it there might be a time that this will need to be drained but I think most importantly close follow-up with the dental clinic to have his teeth pulled would be more important.  Also noted that the patient is hypertensive.  He discontinued his antihypertensives quite some time ago.  He has not seen a physician for quite some time either.  I provided him with his antihypertensives here in the emergency department he will follow up with his primary care or establish care for further evaluation of his hypertension.  I did repeat his medications.  He is given his first doses here in the emergency department.  Antibiotics have also been started      New Prescriptions    CLINDAMYCIN (CLEOCIN) 300 MG CAPSULE    Take 1 capsule (300 mg) by mouth 4 times daily for 10 days    TRAMADOL (ULTRAM) 50 MG TABLET    Take 1 tablet (50 mg) by mouth every 6 hours as needed for severe pain       Final diagnoses:   Secondary hypertension   Dental caries   Non compliance w medication regimen       11/27/2021   [facility]     Priyank Harrison PA-C  11/27/21 1911

## 2021-11-28 NOTE — ED TRIAGE NOTES
ED Nursing Triage Note (General)   ________________________________    Andrews INGA Jansen is a 46 year old Male that presents to triage private car  With history of  Pt states that he thinks he has an abscess on the right side if his face.  Pt states he noticed this around 1300 this afternoon.  Pt does have a decaying tooth in his upper left side reported by patient.  Pt able to swallow and breathe WNL.  Significant symptoms had onset 5 hour(s) ago.    Patient appears alert , in no acute distress., and cooperative behavior.    GCS Eye Opening = 4=Spontaneous  Airway: intact  Breathing noted as Normal  Circulation Normal  Skin:  Normal  Action taken:  Triage to critical care immediately      PRE HOSPITAL PRIOR LIVING SITUATION Spouse

## 2021-12-29 ENCOUNTER — OFFICE VISIT (OUTPATIENT)
Dept: FAMILY MEDICINE | Facility: OTHER | Age: 46
End: 2021-12-29
Attending: FAMILY MEDICINE
Payer: COMMERCIAL

## 2021-12-29 VITALS
BODY MASS INDEX: 41.73 KG/M2 | DIASTOLIC BLOOD PRESSURE: 100 MMHG | SYSTOLIC BLOOD PRESSURE: 150 MMHG | OXYGEN SATURATION: 98 % | WEIGHT: 299.2 LBS | HEART RATE: 90 BPM | RESPIRATION RATE: 20 BRPM | TEMPERATURE: 96.9 F

## 2021-12-29 DIAGNOSIS — I10 HYPERTENSION, UNSPECIFIED TYPE: Primary | ICD-10-CM

## 2021-12-29 DIAGNOSIS — E66.01 MORBID OBESITY (H): ICD-10-CM

## 2021-12-29 DIAGNOSIS — Z91.89 FRAMINGHAM CARDIAC RISK 10-20% IN NEXT 10 YEARS: ICD-10-CM

## 2021-12-29 LAB
ANION GAP SERPL CALCULATED.3IONS-SCNC: 10 MMOL/L (ref 3–14)
BUN SERPL-MCNC: 8 MG/DL (ref 7–25)
CALCIUM SERPL-MCNC: 9.5 MG/DL (ref 8.6–10.3)
CHLORIDE BLD-SCNC: 100 MMOL/L (ref 98–107)
CHOLEST SERPL-MCNC: 235 MG/DL
CO2 SERPL-SCNC: 26 MMOL/L (ref 21–31)
CREAT SERPL-MCNC: 0.98 MG/DL (ref 0.7–1.3)
FASTING STATUS PATIENT QL REPORTED: ABNORMAL
GFR SERPL CREATININE-BSD FRML MDRD: >90 ML/MIN/1.73M2
GLUCOSE BLD-MCNC: 79 MG/DL (ref 70–105)
HDLC SERPL-MCNC: 50 MG/DL (ref 23–92)
LDLC SERPL CALC-MCNC: 158 MG/DL
NONHDLC SERPL-MCNC: 185 MG/DL
POTASSIUM BLD-SCNC: 3.8 MMOL/L (ref 3.5–5.1)
SODIUM SERPL-SCNC: 136 MMOL/L (ref 134–144)
TRIGL SERPL-MCNC: 137 MG/DL

## 2021-12-29 PROCEDURE — 36415 COLL VENOUS BLD VENIPUNCTURE: CPT | Mod: ZL | Performed by: FAMILY MEDICINE

## 2021-12-29 PROCEDURE — 99213 OFFICE O/P EST LOW 20 MIN: CPT | Performed by: FAMILY MEDICINE

## 2021-12-29 PROCEDURE — 80061 LIPID PANEL: CPT | Mod: ZL | Performed by: FAMILY MEDICINE

## 2021-12-29 PROCEDURE — 80048 BASIC METABOLIC PNL TOTAL CA: CPT | Mod: ZL | Performed by: FAMILY MEDICINE

## 2021-12-29 RX ORDER — LISINOPRIL AND HYDROCHLOROTHIAZIDE 20; 25 MG/1; MG/1
1 TABLET ORAL DAILY
Qty: 90 TABLET | Refills: 4 | Status: CANCELLED | OUTPATIENT
Start: 2021-12-29

## 2021-12-29 RX ORDER — AMLODIPINE BESYLATE 10 MG/1
10 TABLET ORAL DAILY
Qty: 90 TABLET | Refills: 4 | Status: SHIPPED | OUTPATIENT
Start: 2021-12-29 | End: 2023-01-02

## 2021-12-29 RX ORDER — LISINOPRIL AND HYDROCHLOROTHIAZIDE 12.5; 2 MG/1; MG/1
1 TABLET ORAL 2 TIMES DAILY
Qty: 180 TABLET | Refills: 3 | Status: SHIPPED | OUTPATIENT
Start: 2021-12-29 | End: 2022-12-20

## 2021-12-29 ASSESSMENT — PAIN SCALES - GENERAL: PAINLEVEL: NO PAIN (0)

## 2021-12-29 NOTE — LETTER
December 30, 2021      Andrews Adornor  [address] [facility] [address]        Dear ,    We are writing to inform you of your test results.    The 10-year ASCVD risk score (Mohit MARCOS Jr., et al., 2013) is: 10.2%    Values used to calculate the score:      Age: 46 years      Sex: Male      Is Non- : No      Diabetic: No      Tobacco smoker: Yes      Systolic Blood Pressure: 150 mmHg      Is BP treated: Yes      HDL Cholesterol: 50 mg/dL      Total Cholesterol: 235 mg/dL  As you can see your cardiac risk factor which is a measure of your chance of having a heart attack in the next 10 years was 10%.  Typically we start a cholesterol-lowering medication above 10%.  I did send a prescription for Lipitor that she can .  I would recommend a follow-up appointment in 3 months.  If you should have any questions please call.      Resulted Orders   Basic Metabolic Panel   Result Value Ref Range    Sodium 136 134 - 144 mmol/L    Potassium 3.8 3.5 - 5.1 mmol/L    Chloride 100 98 - 107 mmol/L    Carbon Dioxide (CO2) 26 21 - 31 mmol/L    Anion Gap 10 3 - 14 mmol/L    Urea Nitrogen 8 7 - 25 mg/dL    Creatinine 0.98 0.70 - 1.30 mg/dL    Calcium 9.5 8.6 - 10.3 mg/dL    Glucose 79 70 - 105 mg/dL    GFR Estimate >90 >60 mL/min/1.73m2      Comment:      Effective December 21, 2021 eGFRcr in adults is calculated using the 2021 CKD-EPI creatinine equation which includes age and gender (Rivka bravo al., NEJ, DOI: 10.1056/WUFIqw2929532)   Lipid Panel   Result Value Ref Range    Cholesterol 235 (H) <200 mg/dL    Triglycerides 137 <150 mg/dL    Direct Measure HDL 50 23 - 92 mg/dL    LDL Cholesterol Calculated 158 (H) <=100 mg/dL    Non HDL Cholesterol 185 (H) <130 mg/dL    Patient Fasting > 8hrs? Unknown     Narrative    Cholesterol  Desirable:  <200 mg/dL    Triglycerides  Normal:  Less than 150 mg/dL  Borderline High:  150-199 mg/dL  High:  200-499 mg/dL  Very High:  Greater than or equal to 500  mg/dL    Direct Measure HDL  Female:  Greater than or equal to 50 mg/dL   Male:  Greater than or equal to 40 mg/dL    LDL Cholesterol  Desirable:  <100mg/dL  Above Desirable:  100-129 mg/dL   Borderline High:  130-159 mg/dL   High:  160-189 mg/dL   Very High:  >= 190 mg/dL    Non HDL Cholesterol  Desirable:  130 mg/dL  Above Desirable:  130-159 mg/dL  Borderline High:  160-189 mg/dL  High:  190-219 mg/dL  Very High:  Greater than or equal to 220 mg/dL       If you have any questions or concerns, please call the clinic at the number listed above.       Sincerely,      Valentin Killian MD

## 2021-12-29 NOTE — NURSING NOTE
Patient here for medication check and refill on BP meds. Medication Reconciliation: complete.    Najma Hardy LPN  12/29/2021 2:30 PM

## 2021-12-30 PROBLEM — E66.01 MORBID OBESITY (H): Status: ACTIVE | Noted: 2021-12-30

## 2021-12-30 RX ORDER — ATORVASTATIN CALCIUM 20 MG/1
20 TABLET, FILM COATED ORAL DAILY
Qty: 90 TABLET | Refills: 3 | Status: SHIPPED | OUTPATIENT
Start: 2021-12-30 | End: 2022-12-20

## 2021-12-30 NOTE — PROGRESS NOTES
SUBJECTIVE:   Andrews Jansen is a 46 year old male who presents to clinic today for the following health issues: Follow-up blood pressure meds    Patient arrives here for follow-up blood pressure meds.  Blood pressure has been borderline.  Currently tolerating his current meds well.        Patient Active Problem List    Diagnosis Date Noted     Morbid obesity (H) 12/30/2021     Priority: Medium     Overweight 09/20/2017     Priority: Medium     Insomnia 02/17/2016     Priority: Medium     Hypertension 07/23/2013     Priority: Medium     No past medical history on file.     Review of Systems     OBJECTIVE:     BP (!) 150/100 (BP Location: Right arm)   Pulse 90   Temp 96.9  F (36.1  C)   Resp 20   Wt 135.7 kg (299 lb 3.2 oz)   SpO2 98%   BMI 41.73 kg/m    Body mass index is 41.73 kg/m .  Physical Exam  Constitutional:       Appearance: Normal appearance.   Cardiovascular:      Rate and Rhythm: Normal rate and regular rhythm.   Neurological:      Mental Status: He is alert.         Diagnostic Test Results:  Results for orders placed or performed in visit on 12/29/21 (from the past 24 hour(s))   Basic Metabolic Panel   Result Value Ref Range    Sodium 136 134 - 144 mmol/L    Potassium 3.8 3.5 - 5.1 mmol/L    Chloride 100 98 - 107 mmol/L    Carbon Dioxide (CO2) 26 21 - 31 mmol/L    Anion Gap 10 3 - 14 mmol/L    Urea Nitrogen 8 7 - 25 mg/dL    Creatinine 0.98 0.70 - 1.30 mg/dL    Calcium 9.5 8.6 - 10.3 mg/dL    Glucose 79 70 - 105 mg/dL    GFR Estimate >90 >60 mL/min/1.73m2   Lipid Panel   Result Value Ref Range    Cholesterol 235 (H) <200 mg/dL    Triglycerides 137 <150 mg/dL    Direct Measure HDL 50 23 - 92 mg/dL    LDL Cholesterol Calculated 158 (H) <=100 mg/dL    Non HDL Cholesterol 185 (H) <130 mg/dL    Patient Fasting > 8hrs? Unknown     Narrative    Cholesterol  Desirable:  <200 mg/dL    Triglycerides  Normal:  Less than 150 mg/dL  Borderline High:  150-199 mg/dL  High:  200-499 mg/dL  Very High:  Greater  than or equal to 500 mg/dL    Direct Measure HDL  Female:  Greater than or equal to 50 mg/dL   Male:  Greater than or equal to 40 mg/dL    LDL Cholesterol  Desirable:  <100mg/dL  Above Desirable:  100-129 mg/dL   Borderline High:  130-159 mg/dL   High:  160-189 mg/dL   Very High:  >= 190 mg/dL    Non HDL Cholesterol  Desirable:  130 mg/dL  Above Desirable:  130-159 mg/dL  Borderline High:  160-189 mg/dL  High:  190-219 mg/dL  Very High:  Greater than or equal to 220 mg/dL       ASSESSMENT/PLAN:         (I10) Hypertension, unspecified type  (primary encounter diagnosis)  Comment: Medications renewed.  Blood pressure borderline.  Will increase Zestoretic to 20/12.5 twice a day.  Continue with blood pressure checks.  Plan: amLODIPine (NORVASC) 10 MG tablet,         lisinopril-hydrochlorothiazide (ZESTORETIC)         20-12.5 MG tablet, Basic Metabolic Panel, Lipid        Panel          The 10-year ASCVD risk score (Doverchemo MARCOS Jr., et al., 2013) is: 10.2%    Values used to calculate the score:      Age: 46 years      Sex: Male      Is Non- : No      Diabetic: No      Tobacco smoker: Yes      Systolic Blood Pressure: 150 mmHg      Is BP treated: Yes      HDL Cholesterol: 50 mg/dL      Total Cholesterol: 235 mg/dL   Patient's cardiac risk factors above 10%.  We will also add Lipitor.  Letter will be dictated.          Valentin Killian MD  [facility] AND [facility]

## 2022-12-18 DIAGNOSIS — Z91.89 FRAMINGHAM CARDIAC RISK 10-20% IN NEXT 10 YEARS: ICD-10-CM

## 2022-12-18 DIAGNOSIS — I10 HYPERTENSION, UNSPECIFIED TYPE: ICD-10-CM

## 2022-12-18 NOTE — LETTER
December 22, 2022      Andrews Jansen  [address] [facility] [address]        Dear Andrews,     This letter is to remind you that you are due for your annual exam with Valentin Killian. Your last comprehensive visit was more than 12 months ago.    Please call the clinic at [phone] to schedule your appointment.    Thank you for choosing [facility] and [facility] for your health care needs.    Sincerely,    Refill HERRERA  [facility]

## 2022-12-20 RX ORDER — LISINOPRIL AND HYDROCHLOROTHIAZIDE 12.5; 2 MG/1; MG/1
TABLET ORAL
Qty: 180 TABLET | Refills: 0 | Status: SHIPPED | OUTPATIENT
Start: 2022-12-20 | End: 2023-02-08

## 2022-12-20 RX ORDER — ATORVASTATIN CALCIUM 20 MG/1
TABLET, FILM COATED ORAL
Qty: 90 TABLET | Refills: 0 | Status: SHIPPED | OUTPATIENT
Start: 2022-12-20 | End: 2023-01-18

## 2022-12-20 NOTE — TELEPHONE ENCOUNTER
LMTCB to schedule appointment.    Pt is due for annual exam after 12/29/22.    Janey Yepez on 12/20/2022 at 10:44 AM

## 2022-12-20 NOTE — TELEPHONE ENCOUNTER
[id number] sent Rx request for the following:      Requested Prescriptions   Pending Prescriptions Disp Refills     lisinopril-hydrochlorothiazide (ZESTORETIC) 20-12.5 MG tablet [Pharmacy Med Name: LISINOPRIL/HCTZ 20-12.5MG TAB] 180 tablet 3     Sig: TAKE 1 TABLET BY MOUTH TWICE A DAY       Diuretics (Including Combos) Protocol Failed - 12/20/2022  9:47 AM        Failed - Blood pressure under 140/90 in past 12 months     BP Readings from Last 3 Encounters:   12/29/21 (!) 150/100   11/27/21 (!) 185/132   04/26/19 146/88             ACE Inhibitors (Including Combos) Protocol Failed - 12/20/2022  9:47 AM        Failed - Blood pressure under 140/90 in past 12 months     BP Readings from Last 3 Encounters:   12/29/21 (!) 150/100   11/27/21 (!) 185/132   04/26/19 146/88      Last Prescription Date:   12/29/21  Last Fill Qty/Refills:         180, R-3        atorvastatin (LIPITOR) 20 MG tablet [Pharmacy Med Name: ATORVASTATIN 20MG TABLET] 90 tablet 3     Sig: TAKE 1 TABLET BY MOUTH ONCE DAILY       Statins Protocol Passed - 12/20/2022  9:47 AM        Passed - LDL on file in past 12 months     Recent Labs   Lab Test 12/29/21  1451   *               Last Prescription Date:   12/30/21  Last Fill Qty/Refills:         90, R-3  Last Office Visit:             12/29/21    Future Office visit:           None    Will be due for annual visit at the end of the month. Will route to provider for review and Outreach for scheduling. Jenna Davies RN on 12/20/2022 at 9:56 AM

## 2022-12-21 NOTE — TELEPHONE ENCOUNTER
LMTCB x 2 to schedule appointment.    Pt is due for annual exam after 12/29/22.    Janey Yepez on 12/21/2022 at 11:06 AM

## 2022-12-22 DIAGNOSIS — Z91.89 FRAMINGHAM CARDIAC RISK 10-20% IN NEXT 10 YEARS: ICD-10-CM

## 2022-12-22 DIAGNOSIS — I10 HYPERTENSION, UNSPECIFIED TYPE: ICD-10-CM

## 2022-12-22 RX ORDER — ATORVASTATIN CALCIUM 20 MG/1
TABLET, FILM COATED ORAL
Qty: 90 TABLET | Refills: 0 | OUTPATIENT
Start: 2022-12-22

## 2022-12-22 RX ORDER — LISINOPRIL AND HYDROCHLOROTHIAZIDE 12.5; 2 MG/1; MG/1
TABLET ORAL
Qty: 180 TABLET | Refills: 0 | OUTPATIENT
Start: 2022-12-22

## 2022-12-22 NOTE — TELEPHONE ENCOUNTER
" Disp Refills Start End TIFFANIE   lisinopril-hydrochlorothiazide (ZESTORETIC) 20-12.5 MG tablet 180 tablet 0 12/20/2022  No   Sig: TAKE 1 TABLET BY MOUTH TWICE A DAY      Disp Refills Start End TIFFANIE   atorvastatin (LIPITOR) 20 MG tablet 90 tablet 0 12/20/2022  No   Sig: TAKE 1 TABLET BY MOUTH ONCE DAILY     [id number] - [place], [place] - [address]     Pharmacy note: \"Patient enrolled in our Rx Med Sync service to improve adherence. We are requesting a refill authorization in advance to ensure an active prescription is on file.\"      Limited refill provided as patient is due for an office visit. Molly Golden RN  ....................  12/22/2022   2:30 PM          "

## 2022-12-31 DIAGNOSIS — I10 HYPERTENSION, UNSPECIFIED TYPE: ICD-10-CM

## 2023-01-02 RX ORDER — AMLODIPINE BESYLATE 10 MG/1
TABLET ORAL
Qty: 90 TABLET | Refills: 0 | Status: SHIPPED | OUTPATIENT
Start: 2023-01-02 | End: 2023-01-18

## 2023-01-02 NOTE — TELEPHONE ENCOUNTER
"[id number] - sent Rx request for the following:      Requested Prescriptions   Pending Prescriptions Disp Refills     amLODIPine (NORVASC) 10 MG tablet [Pharmacy Med Name: AMLODIPINE 10MG TABLET] 90 tablet 4     Sig: TAKE 1 TABLET BY MOUTH ONCE DAILY       Calcium Channel Blockers Protocol  Failed - 12/31/2022  9:15 AM        Failed - Blood pressure under 140/90 in past 12 months     BP Readings from Last 3 Encounters:   12/29/21 (!) 150/100   11/27/21 (!) 185/132   04/26/19 146/88           Failed - Recent (12 mo) or future (30 days) visit within the authorizing provider's specialty     Patient has had an office visit with the authorizing provider or a provider within the authorizing providers department within the previous 12 mos or has a future within next 30 days. See \"Patient Info\" tab in inbasket, or \"Choose Columns\" in Meds & Orders section of the refill encounter.          Failed - Normal serum creatinine on file in past 12 months     Recent Labs   Lab Test 12/29/21  1451   CR 0.98   Ok to refill medication if creatinine is low        Passed - Medication is active on med list        Passed - Patient is age 18 or older     Last Prescription Date:   12/29/21  Last Fill Qty/Refills:         90, R-4  Last Office Visit:              12/29/21   Future Office visit:           None    Patient due for yearly visit.     Venessa Hardy RN on 1/2/2023 at 10:37 AM      "

## 2023-01-11 NOTE — TELEPHONE ENCOUNTER
LMTCB to schedule appointment.    Pt is due for annual exam.    Janey Yepez on 1/11/2023 at 10:04 AM

## 2023-01-18 NOTE — PROGRESS NOTES
Pre-Visit Planning   Next 5 appointments (look out 90 days)    Jan 19, 2023  3:40 PM  PHYSICAL with Valentin Killian MD  [facility] ([facility] ) [address]  [place] [id number] [phone]        Appointment Notes for this encounter:   Annual exam    Questionnaires Reviewed/Assigned  Additional questionnaires assigned: [email or web address]    Patient preferred phone number: [phone]    Unable to reach. Left voicemail. Advised patient to call clinic back at [phone].    Corinne R Thayer, RN on 1/18/2023 at 4:12 PM

## 2023-01-19 ENCOUNTER — OFFICE VISIT (OUTPATIENT)
Dept: FAMILY MEDICINE | Facility: OTHER | Age: 48
End: 2023-01-19
Attending: FAMILY MEDICINE
Payer: COMMERCIAL

## 2023-01-19 VITALS
HEART RATE: 64 BPM | DIASTOLIC BLOOD PRESSURE: 84 MMHG | OXYGEN SATURATION: 97 % | TEMPERATURE: 96.8 F | WEIGHT: 286.4 LBS | SYSTOLIC BLOOD PRESSURE: 124 MMHG | HEIGHT: 71 IN | RESPIRATION RATE: 20 BRPM | BODY MASS INDEX: 40.1 KG/M2

## 2023-01-19 DIAGNOSIS — Z23 NEED FOR COVID-19 VACCINE: Primary | ICD-10-CM

## 2023-01-19 DIAGNOSIS — Z12.11 SCREEN FOR COLON CANCER: ICD-10-CM

## 2023-01-19 DIAGNOSIS — Z23 FLU VACCINE NEED: ICD-10-CM

## 2023-01-19 DIAGNOSIS — Z91.89 FRAMINGHAM CARDIAC RISK 10-20% IN NEXT 10 YEARS: ICD-10-CM

## 2023-01-19 DIAGNOSIS — I10 HYPERTENSION, UNSPECIFIED TYPE: ICD-10-CM

## 2023-01-19 LAB
ANION GAP SERPL CALCULATED.3IONS-SCNC: 11 MMOL/L (ref 7–15)
BUN SERPL-MCNC: 10.6 MG/DL (ref 6–20)
CALCIUM SERPL-MCNC: 9.3 MG/DL (ref 8.6–10)
CHLORIDE SERPL-SCNC: 97 MMOL/L (ref 98–107)
CHOLEST SERPL-MCNC: 169 MG/DL
CREAT SERPL-MCNC: 1.18 MG/DL (ref 0.67–1.17)
DEPRECATED HCO3 PLAS-SCNC: 27 MMOL/L (ref 22–29)
GFR SERPL CREATININE-BSD FRML MDRD: 78 ML/MIN/1.73M2
GLUCOSE SERPL-MCNC: 85 MG/DL (ref 70–99)
HDLC SERPL-MCNC: 48 MG/DL
LDLC SERPL CALC-MCNC: 106 MG/DL
NONHDLC SERPL-MCNC: 121 MG/DL
POTASSIUM SERPL-SCNC: 3.9 MMOL/L (ref 3.4–5.3)
SODIUM SERPL-SCNC: 135 MMOL/L (ref 136–145)
TRIGL SERPL-MCNC: 73 MG/DL

## 2023-01-19 PROCEDURE — 91312 COVID-19 VACCINE BIVALENT BOOSTER 12+ (PFIZER): CPT | Performed by: FAMILY MEDICINE

## 2023-01-19 PROCEDURE — 80048 BASIC METABOLIC PNL TOTAL CA: CPT | Mod: ZL | Performed by: FAMILY MEDICINE

## 2023-01-19 PROCEDURE — 90686 IIV4 VACC NO PRSV 0.5 ML IM: CPT | Performed by: FAMILY MEDICINE

## 2023-01-19 PROCEDURE — 80061 LIPID PANEL: CPT | Mod: ZL | Performed by: FAMILY MEDICINE

## 2023-01-19 PROCEDURE — 36415 COLL VENOUS BLD VENIPUNCTURE: CPT | Mod: ZL | Performed by: FAMILY MEDICINE

## 2023-01-19 PROCEDURE — 90471 IMMUNIZATION ADMIN: CPT | Performed by: FAMILY MEDICINE

## 2023-01-19 PROCEDURE — 0124A COVID-19 VACCINE BIVALENT BOOSTER 12+ (PFIZER): CPT | Performed by: FAMILY MEDICINE

## 2023-01-19 PROCEDURE — 99396 PREV VISIT EST AGE 40-64: CPT | Mod: 25 | Performed by: FAMILY MEDICINE

## 2023-01-19 RX ORDER — AMLODIPINE BESYLATE 10 MG/1
10 TABLET ORAL DAILY
Qty: 90 TABLET | Refills: 4 | Status: SHIPPED | OUTPATIENT
Start: 2023-01-19 | End: 2024-03-21

## 2023-01-19 RX ORDER — ATORVASTATIN CALCIUM 20 MG/1
20 TABLET, FILM COATED ORAL DAILY
Qty: 90 TABLET | Refills: 4 | Status: SHIPPED | OUTPATIENT
Start: 2023-01-19 | End: 2024-03-21

## 2023-01-19 ASSESSMENT — ENCOUNTER SYMPTOMS
NAUSEA: 0
DIZZINESS: 0
CONSTIPATION: 0
ARTHRALGIAS: 1
EYE PAIN: 0
JOINT SWELLING: 0
WEAKNESS: 0
HEARTBURN: 0
PARESTHESIAS: 0
CHILLS: 0
DIARRHEA: 0
COUGH: 0
MYALGIAS: 1
FREQUENCY: 0
SORE THROAT: 0
HEMATOCHEZIA: 0
HEADACHES: 0
NERVOUS/ANXIOUS: 0
FEVER: 0
PALPITATIONS: 0
ABDOMINAL PAIN: 0
HEMATURIA: 0
SHORTNESS OF BREATH: 0
DYSURIA: 0

## 2023-01-19 ASSESSMENT — PAIN SCALES - GENERAL: PAINLEVEL: NO PAIN (0)

## 2023-01-19 NOTE — PROGRESS NOTES
"  SUBJECTIVE:   Andrews Jansen is a 47 year old male who presents to clinic today for the following health issues: Physical    Patient arrives here for physical.  Currently no complaints.  He does wish immunization update and influenza and COVID.  Tolerating his blood pressure meds well along with his cholesterol meds    Healthy Habits:     Getting at least 3 servings of Calcium per day:  Yes    Bi-annual eye exam:  Yes    Dental care twice a year:  Yes    Sleep apnea or symptoms of sleep apnea:  None    Diet:  Regular (no restrictions) and Low salt    Frequency of exercise:  4-5 days/week    Duration of exercise:  Greater than 60 minutes    Taking medications regularly:  Yes    Medication side effects:  None    PHQ-2 Total Score: 0    Additional concerns today:  No        Patient Active Problem List    Diagnosis Date Noted     Need for COVID-19 vaccine 01/19/2023     Priority: Medium     Screen for colon cancer 01/19/2023     Priority: Medium     Morbid obesity (H) 12/30/2021     Priority: Medium     Overweight 09/20/2017     Priority: Medium     Insomnia 02/17/2016     Priority: Medium     Hypertension 07/23/2013     Priority: Medium     No past medical history on file.   No past surgical history on file.  Current Outpatient Medications   Medication Sig Dispense Refill     amLODIPine (NORVASC) 10 MG tablet Take 1 tablet (10 mg) by mouth daily 90 tablet 4     atorvastatin (LIPITOR) 20 MG tablet Take 1 tablet (20 mg) by mouth daily 90 tablet 4     lisinopril-hydrochlorothiazide (ZESTORETIC) 20-12.5 MG tablet TAKE 1 TABLET BY MOUTH TWICE A  tablet 0     No Known Allergies    Review of Systems     OBJECTIVE:     /84   Pulse 64   Temp 96.8  F (36  C)   Resp 20   Ht 1.803 m (5' 11\")   Wt 129.9 kg (286 lb 6.4 oz)   SpO2 97%   BMI 39.94 kg/m    Body mass index is 39.94 kg/m .  Physical Exam  Constitutional:       Appearance: Normal appearance.   HENT:      Right Ear: Tympanic membrane normal.      Left " Ear: Tympanic membrane normal.      Mouth/Throat:      Mouth: Mucous membranes are moist.   Eyes:      Pupils: Pupils are equal, round, and reactive to light.   Cardiovascular:      Rate and Rhythm: Normal rate and regular rhythm.   Pulmonary:      Effort: Pulmonary effort is normal.      Breath sounds: Normal breath sounds.   Abdominal:      General: Abdomen is flat.   Skin:     General: Skin is warm.   Neurological:      Mental Status: He is alert.   Psychiatric:         Mood and Affect: Mood normal.         Diagnostic Test Results:  No results found for this or any previous visit (from the past 24 hour(s)).    ASSESSMENT/PLAN:         (Z23) Need for COVID-19 vaccine  (primary encounter diagnosis)  Comment:   Plan: COVID-19,PF,PFIZER BOOSTER BIVALENT (12+YRS)            (Z12.11) Screen for colon cancer  Comment:   Plan: COLOGUARD(EX[employer] SCIENCES)            (Z23) Flu vaccine need  Comment: Currently under good control  Plan: INFLUENZA VACCINE IM > 6 MONTHS VALENT IIV4         (AFLURIA/FLUZONE)            (I10) Hypertension, unspecified type  Comment: Currently under good control  Plan: amLODIPine (NORVASC) 10 MG tablet, Basic         Metabolic Panel            (Z91.89) [place] cardiac risk 10-20% in next 10 years  Comment:   Plan: atorvastatin (LIPITOR) 20 MG tablet, Lipid         Panel        Continue        Valentin Killian MD  [facility] AND [facility]

## 2023-01-19 NOTE — NURSING NOTE
Patient here for yearly physical, last eye exam 2021 and last dental exam 6 months ago. Medication Reconciliation: complete.    Najma Hardy LPN  1/19/2023 3:54 PM

## 2023-02-02 ENCOUNTER — LAB (OUTPATIENT)
Dept: FAMILY MEDICINE | Facility: OTHER | Age: 48
End: 2023-02-02
Payer: COMMERCIAL

## 2023-02-02 DIAGNOSIS — Z12.11 SCREEN FOR COLON CANCER: ICD-10-CM

## 2023-02-08 DIAGNOSIS — I10 HYPERTENSION, UNSPECIFIED TYPE: ICD-10-CM

## 2023-02-08 NOTE — TELEPHONE ENCOUNTER
Message from pharmacy:  Patient is needing refills - had appt on 1/19/23 and did not receive refills for this Rx.    Debra Chong RN .............. 2/8/2023  2:19 PM

## 2023-02-10 NOTE — TELEPHONE ENCOUNTER
Called [facility].  Confirmed receipt for current lisinopril-hydrochlorothiazide Rx.  They will fill this now.      Pt will be out of this med mid March.  Physical on 1/19.  Routing to PCP for extended refills to last until next physical.

## 2023-02-14 RX ORDER — LISINOPRIL AND HYDROCHLOROTHIAZIDE 12.5; 2 MG/1; MG/1
1 TABLET ORAL 2 TIMES DAILY
Qty: 180 TABLET | Refills: 3 | Status: SHIPPED | OUTPATIENT
Start: 2023-02-14 | End: 2024-03-21

## 2023-03-13 LAB — NONINV COLON CA DNA+OCC BLD SCRN STL QL: NEGATIVE

## 2024-02-24 ENCOUNTER — HEALTH MAINTENANCE LETTER (OUTPATIENT)
Age: 49
End: 2024-02-24

## 2024-03-19 DIAGNOSIS — Z91.89 FRAMINGHAM CARDIAC RISK 10-20% IN NEXT 10 YEARS: ICD-10-CM

## 2024-03-19 DIAGNOSIS — I10 HYPERTENSION, UNSPECIFIED TYPE: ICD-10-CM

## 2024-03-21 RX ORDER — LISINOPRIL AND HYDROCHLOROTHIAZIDE 12.5; 2 MG/1; MG/1
1 TABLET ORAL 2 TIMES DAILY
Qty: 60 TABLET | Refills: 1 | Status: SHIPPED | OUTPATIENT
Start: 2024-03-21 | End: 2024-04-10

## 2024-03-21 RX ORDER — AMLODIPINE BESYLATE 10 MG/1
10 TABLET ORAL DAILY
Qty: 30 TABLET | Refills: 1 | Status: SHIPPED | OUTPATIENT
Start: 2024-03-21 | End: 2024-04-10

## 2024-03-21 RX ORDER — ATORVASTATIN CALCIUM 20 MG/1
20 TABLET, FILM COATED ORAL DAILY
Qty: 30 TABLET | Refills: 1 | Status: SHIPPED | OUTPATIENT
Start: 2024-03-21 | End: 2024-04-10

## 2024-03-21 NOTE — TELEPHONE ENCOUNTER
Pamela Quiroz sent Rx request for the following:   Patient due for physical. Sending to scheduling for appt. Teed up 60 day supply for consideration.          Requested Prescriptions   Pending Prescriptions Disp Refills    amLODIPine (NORVASC) 10 MG tablet [Pharmacy Med Name: AMLODIPINE 10MG TABLET] 90 tablet 4     Sig: TAKE 1 TABLET (10 MG) BY MOUTH DAILY       Calcium Channel Blockers Protocol  Failed - 3/19/2024  1:00 AM        Failed - Blood pressure under 140/90 in past 12 months     BP Readings from Last 3 Encounters:   01/19/23 124/84   12/29/21 (!) 150/100   11/27/21 (!) 185/132                 Failed - Recent (12 mo) or future (30 days) visit within the authorizing provider's specialty     The patient must have completed an in-person or virtual visit within the past 12 months or has a future visit scheduled within the next 90 days with the authorizing provider s specialty.  Urgent care and e-visits do not quality as an office visit for this protocol.          Failed - Normal serum creatinine on file in past 12 months     Recent Labs   Lab Test 01/19/23  1619   CR 1.18*         Last Prescription Date:   1/19/2023  Last Fill Qty/Refills:         90, R-4          atorvastatin (LIPITOR) 20 MG tablet [Pharmacy Med Name: ATORVASTATIN 20MG TABLET] 90 tablet 4     Sig: TAKE 1 TABLET (20 MG) BY MOUTH DAILY       Antihyperlipidemic agents Failed - 3/19/2024  1:00 AM        Failed - LDL on file in the past 12 months        Failed - Recent (12 mo) or future (90 days) visit within the authorizing provider's specialty     The patient must have completed an in-person or virtual visit within the past 12 months or has a future visit scheduled within the next 90 days with the authorizing provider s specialty.  Urgent care and e-visits do not quality as an office visit for this protocol.      Last Prescription Date:   1/19/2023  Last Fill Qty/Refills:         90, R-4    lisinopril-hydrochlorothiazide (ZESTORETIC) 20-12.5 MG  tablet [Pharmacy Med Name: LISINOPRIL/HCTZ 20-12.5MG TAB] 180 tablet 3     Sig: TAKE 1 TABLET BY MOUTH 2 TIMES DAILY       Diuretics (Including Combos) Protocol Failed - 3/19/2024  1:00 AM        Failed - Blood pressure under 140/90 in past 12 months     BP Readings from Last 3 Encounters:   01/19/23 124/84   12/29/21 (!) 150/100   11/27/21 (!) 185/132                 Failed - Has GFR on file in past 12 months and most recent value is normal        Failed - Recent (12 mo) or future (90 days) visit within the authorizing provider's specialty     The patient must have completed an in-person or virtual visit within the past 12 months or has a future visit scheduled within the next 90 days with the authorizing provider s specialty.  Urgent care and e-visits do not quality as an office visit for this protocol.      ACE Inhibitors (Including Combos) Protocol Failed - 3/19/2024  1:00 AM        Failed - Blood pressure under 140/90 in past 12 months     BP Readings from Last 3 Encounters:   01/19/23 124/84   12/29/21 (!) 150/100   11/27/21 (!) 185/132                 Failed - Has GFR on file in past 12 months and most recent value is normal        Failed - Recent (12 mo) or future (90 days) visit within the authorizing provider's specialty     The patient must have completed an in-person or virtual visit within the past 12 months or has a future visit scheduled within the next 90 days with the authorizing provider s specialty.  Urgent care and e-visits do not quality as an office visit for this protocol.          Failed - Normal serum creatinine on file in past 12 months     Recent Labs   Lab Test 01/19/23  1619   CR 1.18*                 Failed - Normal serum potassium on file in past 12 months     Recent Labs   Lab Test 01/19/23  1619   POTASSIUM 3.9               Last Prescription Date:   2/14/2023  Last Fill Qty/Refills:         180, R-3    Last Office Visit:              1/19/2023   Future Office visit:           None      Fernanda Finley RN.......3/21/23083:12 PM

## 2024-03-25 ENCOUNTER — TELEPHONE (OUTPATIENT)
Dept: FAMILY MEDICINE | Facility: OTHER | Age: 49
End: 2024-03-25
Payer: COMMERCIAL

## 2024-04-10 ENCOUNTER — OFFICE VISIT (OUTPATIENT)
Dept: FAMILY MEDICINE | Facility: OTHER | Age: 49
End: 2024-04-10
Attending: FAMILY MEDICINE
Payer: COMMERCIAL

## 2024-04-10 VITALS
HEART RATE: 72 BPM | HEIGHT: 72 IN | WEIGHT: 293.4 LBS | OXYGEN SATURATION: 96 % | SYSTOLIC BLOOD PRESSURE: 128 MMHG | TEMPERATURE: 96.4 F | DIASTOLIC BLOOD PRESSURE: 88 MMHG | BODY MASS INDEX: 39.74 KG/M2 | RESPIRATION RATE: 20 BRPM

## 2024-04-10 DIAGNOSIS — I10 HYPERTENSION, UNSPECIFIED TYPE: ICD-10-CM

## 2024-04-10 DIAGNOSIS — Z91.89 FRAMINGHAM CARDIAC RISK 10-20% IN NEXT 10 YEARS: ICD-10-CM

## 2024-04-10 DIAGNOSIS — Z02.89 HEALTH EXAMINATION OF DEFINED SUBPOPULATION: ICD-10-CM

## 2024-04-10 DIAGNOSIS — Z72.0 TOBACCO ABUSE: ICD-10-CM

## 2024-04-10 DIAGNOSIS — Z00.00 ENCOUNTER FOR MEDICARE ANNUAL WELLNESS EXAM: Primary | ICD-10-CM

## 2024-04-10 PROCEDURE — 99396 PREV VISIT EST AGE 40-64: CPT | Mod: 25 | Performed by: FAMILY MEDICINE

## 2024-04-10 PROCEDURE — 90715 TDAP VACCINE 7 YRS/> IM: CPT | Performed by: FAMILY MEDICINE

## 2024-04-10 PROCEDURE — 90471 IMMUNIZATION ADMIN: CPT | Performed by: FAMILY MEDICINE

## 2024-04-10 RX ORDER — VARENICLINE TARTRATE 1 MG/1
1 TABLET, FILM COATED ORAL 2 TIMES DAILY
Qty: 30 TABLET | Refills: 4 | Status: SHIPPED | OUTPATIENT
Start: 2024-04-10 | End: 2024-07-17

## 2024-04-10 RX ORDER — VARENICLINE TARTRATE 0.5 (11)-1
KIT ORAL
Qty: 53 TABLET | Refills: 0 | Status: SHIPPED | OUTPATIENT
Start: 2024-04-10

## 2024-04-10 RX ORDER — ATORVASTATIN CALCIUM 20 MG/1
20 TABLET, FILM COATED ORAL DAILY
Qty: 90 TABLET | Refills: 4 | Status: SHIPPED | OUTPATIENT
Start: 2024-04-10 | End: 2024-04-10

## 2024-04-10 RX ORDER — ATORVASTATIN CALCIUM 40 MG/1
40 TABLET, FILM COATED ORAL DAILY
Qty: 90 TABLET | Refills: 4 | Status: SHIPPED | OUTPATIENT
Start: 2024-04-10

## 2024-04-10 RX ORDER — AMLODIPINE BESYLATE 10 MG/1
10 TABLET ORAL DAILY
Qty: 90 TABLET | Refills: 4 | Status: SHIPPED | OUTPATIENT
Start: 2024-04-10

## 2024-04-10 RX ORDER — LISINOPRIL AND HYDROCHLOROTHIAZIDE 12.5; 2 MG/1; MG/1
1 TABLET ORAL 2 TIMES DAILY
Qty: 270 TABLET | Refills: 4 | Status: SHIPPED | OUTPATIENT
Start: 2024-04-10

## 2024-04-10 SDOH — HEALTH STABILITY: PHYSICAL HEALTH: ON AVERAGE, HOW MANY MINUTES DO YOU ENGAGE IN EXERCISE AT THIS LEVEL?: 90 MIN

## 2024-04-10 SDOH — HEALTH STABILITY: PHYSICAL HEALTH: ON AVERAGE, HOW MANY DAYS PER WEEK DO YOU ENGAGE IN MODERATE TO STRENUOUS EXERCISE (LIKE A BRISK WALK)?: 4 DAYS

## 2024-04-10 ASSESSMENT — SOCIAL DETERMINANTS OF HEALTH (SDOH): HOW OFTEN DO YOU GET TOGETHER WITH FRIENDS OR RELATIVES?: ONCE A WEEK

## 2024-04-10 ASSESSMENT — PAIN SCALES - GENERAL: PAINLEVEL: NO PAIN (0)

## 2024-04-10 NOTE — NURSING NOTE
Patient here for annual physical, last eye exam April 2023 and last dental exam Jan 2024. Medication Reconciliation: complete.    Najma Hardy LPN  4/10/2024 1:48 PM

## 2024-04-10 NOTE — PROGRESS NOTES
Preventive Care Visit  [facility]  Valentin Killian MD, Family Medicine  Apr 10, 2024    The 10-year ASCVD risk score (Mckenzie FONTANA, et al., 2019) is: 11.7%    Values used to calculate the score:      Age: 48 years      Sex: Male      Is Non- : Yes      Diabetic: No      Tobacco smoker: Yes      Systolic Blood Pressure: 128 mmHg      Is BP treated: Yes      HDL Cholesterol: 48 mg/dL      Total Cholesterol: 169 mg/dL      Assessment & Plan     Hypertension, unspecified type  Currently under good control refill  - amLODIPine (NORVASC) 10 MG tablet; Take 1 tablet (10 mg) by mouth daily  - lisinopril-hydrochlorothiazide (ZESTORETIC) 20-12.5 MG tablet; Take 1 tablet by mouth 2 times daily    [place] cardiac risk 10-20% in next 10 years  Cardiac risk score 11%.  Discussed decreasing it by increasing his Lipitor.  Will increase to 40.  - atorvastatin (LIPITOR) 40 MG tablet; Take 1 tablet (40 mg) by mouth daily      Health examination of defined subpopulation  Lipid panel basic metabolic panel and 2 to 4 weeks  - Lipid Panel; Future  - Basic Metabolic Panel; Future    Tobacco abuse    - varenicline (CHANTIX INA) 0.5 MG X 11 & 1 MG X 42 tablet; Take 0.5 mg tab daily for 3 days, THEN 0.5 mg tab twice daily for 4 days, THEN 1 mg twice daily.  - varenicline (CHANTIX) 1 MG tablet; Take 1 tablet (1 mg) by mouth 2 times daily              Nicotine/Tobacco Cessation  He reports that he has been smoking cigarettes. He has never used smokeless tobacco.  Nicotine/Tobacco Cessation Plan        BMI  Estimated body mass index is 39.79 kg/m  as calculated from the following:    Height as of this encounter: 1.829 m (6').    Weight as of this encounter: 133.1 kg (293 lb 6.4 oz).       Counseling  Appropriate preventive services were discussed with this patient, including applicable screening as appropriate for fall prevention, nutrition, physical activity, Tobacco-use cessation, weight loss and  cognition.  Checklist reviewing preventive services available has been given to the patient.  Reviewed patient's diet, addressing concerns and/or questions.   He is at risk for psychosocial distress and has been provided with information to reduce risk.   The patient reports drinking more than one alcoholic drink per day and sometimes engages in binge or excessive drinking. The patient was counseled and given information about possible harmful effects of excessive alcohol intake as well as where to get help for alcohol problems.         No follow-ups on file.    Antony Sparrow is a 48 year old, presenting for the following:  Physical         Health Care Directive  Patient does not have a Health Care Directive or Living Will:     Patient arrives here for physical.  Currently no complaints or concerns.                  4/10/2024   General Health   How would you rate your overall physical health? Good   Feel stress (tense, anxious, or unable to sleep) Only a little   (!) STRESS CONCERN      4/10/2024   Nutrition   Three or more servings of calcium each day? Yes   Diet: Regular (no restrictions)   How many servings of fruit and vegetables per day? (!) 2-3   How many sweetened beverages each day? (!) 2         4/10/2024   Exercise   Days per week of moderate/strenous exercise 4 days   Average minutes spent exercising at this level 90 min         4/10/2024   Social Factors   Frequency of gathering with friends or relatives Once a week   Worry food won't last until get money to buy more No   Food not last or not have enough money for food? No   Do you have housing?  Yes   Are you worried about losing your housing? No   Lack of transportation? No   Unable to get utilities (heat,electricity)? No         4/10/2024   Dental   Dentist two times every year? Yes         4/10/2024   TB Screening   Were you born outside of the [place]? No         Today's PHQ-2 Score:       4/10/2024     1:46 PM   PHQ-2 ( 1999 [employer])   Q1: Little  interest or pleasure in doing things 0   Q2: Feeling down, depressed or hopeless 0   PHQ-2 Score 0   Q1: Little interest or pleasure in doing things Not at all   Q2: Feeling down, depressed or hopeless Not at all   PHQ-2 Score 0           4/10/2024   Substance Use   Alcohol more than 3/day or more than 7/wk Yes   How often do you have a drink containing alcohol 2 to 3 times a week   How many alcohol drinks on typical day 3 or 4   How often do you have 5+ drinks at one occasion Monthly   Audit 2/3 Score 3   How often not able to stop drinking once started Never   How often failed to do what normally expected Never   How often needed first drink in am after a heavy drinking session Never   How often feeling of guilt or remorse after drinking Never   How often unable to remember what happened the night before Never   Have you or someone else been injured because of your drinking No   Has anyone been concerned or suggested you cut down on drinking No   TOTAL SCORE - AUDIT 6   Do you use any other substances recreationally? (!) ALCOHOL     Social History     Tobacco Use    Smoking status: Some Days     Types: Cigarettes    Smokeless tobacco: Never   Vaping Use    Vaping status: Never Used   Substance Use Topics    Alcohol use: Yes     Comment: 3 days a week    Drug use: Never     Types: Other     Comment: Drug use: No           4/10/2024   STI Screening   New sexual partner(s) since last STI/HIV test? No   ASCVD Risk   The 10-year ASCVD risk score (Mckenzie FONTANA, et al., 2019) is: 11.7%    Values used to calculate the score:      Age: 48 years      Sex: Male      Is Non- : Yes      Diabetic: No      Tobacco smoker: Yes      Systolic Blood Pressure: 128 mmHg      Is BP treated: Yes      HDL Cholesterol: 48 mg/dL      Total Cholesterol: 169 mg/dL        4/10/2024   Contraception/Family Planning   Questions about contraception or family planning No        Reviewed and updated as needed this  visit by Provider                             Objective    Exam  /88   Pulse 72   Temp (!) 96.4  F (35.8  C)   Resp 20   Ht 1.829 m (6')   Wt 133.1 kg (293 lb 6.4 oz)   SpO2 96%   BMI 39.79 kg/m     Estimated body mass index is 39.79 kg/m  as calculated from the following:    Height as of this encounter: 1.829 m (6').    Weight as of this encounter: 133.1 kg (293 lb 6.4 oz).    Physical Exam  Constitutional:       Appearance: Normal appearance.   HENT:      Head: Normocephalic and atraumatic.   Cardiovascular:      Rate and Rhythm: Normal rate and regular rhythm.   Pulmonary:      Effort: No respiratory distress.   Abdominal:      General: Abdomen is flat.   Skin:     General: Skin is warm.   Neurological:      Mental Status: He is alert.   Psychiatric:         Mood and Affect: Mood normal.         Thought Content: Thought content normal.       GENERAL: alert and no distress  NECK: no adenopathy, no asymmetry, masses, or scars  RESP: lungs clear to auscultation - no rales, rhonchi or wheezes  CV: regular rate and rhythm, normal S1 S2, no S3 or S4, no murmur, click or rub, no peripheral edema  ABDOMEN: soft, nontender, no hepatosplenomegaly, no masses and bowel sounds normal  MS: no gross musculoskeletal defects noted, no edema        Signed Electronically by: Valentin Killian MD

## 2024-05-10 ENCOUNTER — LAB (OUTPATIENT)
Dept: LAB | Facility: OTHER | Age: 49
End: 2024-05-10
Attending: FAMILY MEDICINE
Payer: COMMERCIAL

## 2024-05-10 DIAGNOSIS — Z02.89 HEALTH EXAMINATION OF DEFINED SUBPOPULATION: ICD-10-CM

## 2024-05-10 LAB
ANION GAP SERPL CALCULATED.3IONS-SCNC: 12 MMOL/L (ref 7–15)
BUN SERPL-MCNC: 7 MG/DL (ref 6–20)
CALCIUM SERPL-MCNC: 9.6 MG/DL (ref 8.6–10)
CHLORIDE SERPL-SCNC: 96 MMOL/L (ref 98–107)
CHOLEST SERPL-MCNC: 137 MG/DL
CREAT SERPL-MCNC: 1.02 MG/DL (ref 0.67–1.17)
DEPRECATED HCO3 PLAS-SCNC: 27 MMOL/L (ref 22–29)
EGFRCR SERPLBLD CKD-EPI 2021: >90 ML/MIN/1.73M2
FASTING STATUS PATIENT QL REPORTED: ABNORMAL
FASTING STATUS PATIENT QL REPORTED: NORMAL
GLUCOSE SERPL-MCNC: 95 MG/DL (ref 70–99)
HDLC SERPL-MCNC: 48 MG/DL
LDLC SERPL CALC-MCNC: 76 MG/DL
NONHDLC SERPL-MCNC: 89 MG/DL
POTASSIUM SERPL-SCNC: 3.8 MMOL/L (ref 3.4–5.3)
SODIUM SERPL-SCNC: 135 MMOL/L (ref 135–145)
TRIGL SERPL-MCNC: 64 MG/DL

## 2024-05-10 PROCEDURE — 80061 LIPID PANEL: CPT | Mod: ZL

## 2024-05-10 PROCEDURE — 36415 COLL VENOUS BLD VENIPUNCTURE: CPT | Mod: ZL

## 2024-05-10 PROCEDURE — 80048 BASIC METABOLIC PNL TOTAL CA: CPT | Mod: ZL

## 2024-05-16 ENCOUNTER — OFFICE VISIT (OUTPATIENT)
Dept: FAMILY MEDICINE | Facility: OTHER | Age: 49
End: 2024-05-16
Attending: FAMILY MEDICINE
Payer: COMMERCIAL

## 2024-05-16 VITALS
SYSTOLIC BLOOD PRESSURE: 128 MMHG | DIASTOLIC BLOOD PRESSURE: 82 MMHG | HEART RATE: 92 BPM | RESPIRATION RATE: 20 BRPM | BODY MASS INDEX: 38.63 KG/M2 | WEIGHT: 284.8 LBS | OXYGEN SATURATION: 96 % | TEMPERATURE: 98.5 F

## 2024-05-16 DIAGNOSIS — F17.200 NICOTINE DEPENDENCE, UNCOMPLICATED, UNSPECIFIED NICOTINE PRODUCT TYPE: ICD-10-CM

## 2024-05-16 DIAGNOSIS — Z72.0 TOBACCO ABUSE: Primary | ICD-10-CM

## 2024-05-16 DIAGNOSIS — M12.812 ROTATOR CUFF ARTHROPATHY OF LEFT SHOULDER: ICD-10-CM

## 2024-05-16 DIAGNOSIS — E78.00 HYPERCHOLESTEROLEMIA: ICD-10-CM

## 2024-05-16 PROCEDURE — 99214 OFFICE O/P EST MOD 30 MIN: CPT | Performed by: FAMILY MEDICINE

## 2024-05-16 ASSESSMENT — PAIN SCALES - GENERAL: PAINLEVEL: NO PAIN (0)

## 2024-05-16 NOTE — NURSING NOTE
Patient here for follow up labs. The Chantex is working for him. Medication Reconciliation: complete.    Najma Hardy LPN  5/16/2024 2:06 PM

## 2024-05-17 PROBLEM — Z23 NEED FOR COVID-19 VACCINE: Status: RESOLVED | Noted: 2023-01-19 | Resolved: 2024-05-17

## 2024-05-17 PROBLEM — M12.812 ROTATOR CUFF ARTHROPATHY OF LEFT SHOULDER: Status: ACTIVE | Noted: 2024-05-17

## 2024-05-17 PROBLEM — E78.00 HYPERCHOLESTEROLEMIA: Status: ACTIVE | Noted: 2024-05-17

## 2024-05-17 NOTE — PROGRESS NOTES
SUBJECTIVE:   Andrews Jansen is a 48 year old male who presents to clinic today for the following health issues: Laboratory follow-up.  Occasional left shoulder pain    Patient arrives here for follow-up of his cholesterol.  He had a cholesterol done about a week ago.  And shows improvement from his previous.  Patient also has been having left shoulder pain.  This occasionally with lifting or reaching above his head.  States sleeping occasionally hurts.  Was wondering about an MRI.  He states reaching above his head makes it worse.    History of Present Illness       Hyperlipidemia:  He presents for follow up of hyperlipidemia.   He is taking medication to lower cholesterol. He is not having myalgia or other side effects to statin medications.    Hypertension: He presents for follow up of hypertension.  He does check blood pressure  regularly outside of the clinic. Outpatient blood pressures have not been over 140/90. He does not follow a low salt diet.     He eats 2-3 servings of fruits and vegetables daily.He consumes 2 sweetened beverage(s) daily.He exercises with enough effort to increase his heart rate 30 to 60 minutes per day.  He exercises with enough effort to increase his heart rate 4 days per week.   He is taking medications regularly.        Patient Active Problem List    Diagnosis Date Noted    Hypercholesterolemia 05/17/2024     Priority: Medium    Rotator cuff arthropathy of left shoulder 05/17/2024     Priority: Medium    Tobacco abuse 04/10/2024     Priority: Medium    Screen for colon cancer 01/19/2023     Priority: Medium    Morbid obesity (H) 12/30/2021     Priority: Medium    Overweight 09/20/2017     Priority: Medium    Insomnia 02/17/2016     Priority: Medium    Hypertension 07/23/2013     Priority: Medium     No past medical history on file.   No past surgical history on file.    Review of Systems     OBJECTIVE:     /82 (BP Location: Other (Comment))   Pulse 92   Temp 98.5  F (36.9   C)   Resp 20   Wt 129.2 kg (284 lb 12.8 oz)   SpO2 96%   BMI 38.63 kg/m    Body mass index is 38.63 kg/m .  Physical Exam  Musculoskeletal:         General: Normal range of motion.      Comments: Range of motion is good.  But abduction past 90 degrees does produce some pain and discomfort.   Neurological:      Mental Status: He is alert.         Diagnostic Test Results:  none     ASSESSMENT/PLAN:         (Z72.0) Tobacco abuse  (primary encounter diagnosis)  Comment: Patient is on Chantix.  Reports he has not had a cigarette for 1 month.  Plan:     (E78.00) Hypercholesterolemia  Comment: SatisfactoryPlan:     Rotator cuff arthropathy.  I had a long discussion with the patient about proceeding with MRIs.  Versus observation.  He is currently not ready to proceed with any kind of surgical and or intervention.  Advised him when he gets to the point to follow-up in clinic.  Valentin Killian MD  [facility]

## 2024-07-13 DIAGNOSIS — Z72.0 TOBACCO ABUSE: ICD-10-CM

## 2024-07-17 RX ORDER — VARENICLINE TARTRATE 1 MG/1
1 TABLET, FILM COATED ORAL 2 TIMES DAILY
Qty: 30 TABLET | Refills: 4 | Status: SHIPPED | OUTPATIENT
Start: 2024-07-17

## 2024-07-17 NOTE — TELEPHONE ENCOUNTER
[id number] sent Rx request for the following:      Requested Prescriptions   Pending Prescriptions Disp Refills    varenicline (CHANTIX) 1 MG tablet [Pharmacy Med Name: VARENICLINE 1MG TABLET] 30 tablet 4     Sig: TAKE 1 TABLET (1 MG) BY MOUTH 2 TIMES DAILY       Partial Cholinergic Nicotinic Agonist Agents Passed - 7/17/2024  3:23 PM        Passed - Blood pressure under 140/90 in past 12 months     BP Readings from Last 3 Encounters:   05/16/24 128/82   04/10/24 128/88   01/19/23 124/84       No data recorded            Passed - Medication is active on med list        Passed - Creatine on file in past 12 months and is > 35        Passed - Medication indicated for associated diagnosis     Medication is associated with one or more of the following diagnoses:  Smoking cessation  Tobacco use/abuse  Nicotine use/abuse  Nicotine dependency  Vaping nicotine          Passed - Recent (12 mo) or future (90 days) visit within the authorizing provider's specialty     The patient must have completed an in-person or virtual visit within the past 12 months or has a future visit scheduled within the next 90 days with the authorizing provider s specialty.  Urgent care and e-visits do not quality as an office visit for this protocol.          Passed - Patient is 18 years of age or older             Last Prescription Date:   4/10/24  Last Fill Qty/Refills:         30, R-4    Last Office Visit:              5/16/24   Future Office visit:           none      Prescription approved per [facility] Refill Protocol.  Marisol Chaparro RN on 7/17/2024 at 3:26 PM

## 2025-01-09 DIAGNOSIS — Z72.0 TOBACCO ABUSE: ICD-10-CM

## 2025-01-09 RX ORDER — VARENICLINE TARTRATE 1 MG/1
1 TABLET, FILM COATED ORAL 2 TIMES DAILY
Qty: 30 TABLET | Refills: 4 | Status: SHIPPED | OUTPATIENT
Start: 2025-01-09

## 2025-01-09 NOTE — TELEPHONE ENCOUNTER
[id number] sent Rx request for the following:      Requested Prescriptions   Pending Prescriptions Disp Refills    varenicline (CHANTIX) 1 MG tablet [Pharmacy Med Name: VARENICLINE 1MG TABLET] 30 tablet 4     Sig: TAKE 1 TABLET (1 MG) BY MOUTH 2 TIMES DAILY   Last Prescription Date:   7/17/24  Last Fill Qty/Refills:         30, R-4    Last Office Visit:              5/16/24 (tobacco abuse discussed)   Future Office visit:           None    Prescription refilled per RN Medication Refill Policy.................... Debra Chong RN ....................  1/9/2025   11:01 AM

## 2025-03-27 ENCOUNTER — PATIENT OUTREACH (OUTPATIENT)
Dept: CARE COORDINATION | Facility: CLINIC | Age: 50
End: 2025-03-27
Payer: COMMERCIAL

## 2025-05-17 ENCOUNTER — HEALTH MAINTENANCE LETTER (OUTPATIENT)
Age: 50
End: 2025-05-17

## 2025-06-05 ENCOUNTER — OFFICE VISIT (OUTPATIENT)
Dept: FAMILY MEDICINE | Facility: OTHER | Age: 50
End: 2025-06-05
Attending: FAMILY MEDICINE
Payer: COMMERCIAL

## 2025-06-05 VITALS
TEMPERATURE: 97 F | DIASTOLIC BLOOD PRESSURE: 75 MMHG | HEIGHT: 71 IN | OXYGEN SATURATION: 98 % | RESPIRATION RATE: 16 BRPM | SYSTOLIC BLOOD PRESSURE: 138 MMHG | BODY MASS INDEX: 41.64 KG/M2 | WEIGHT: 297.4 LBS | HEART RATE: 72 BPM

## 2025-06-05 DIAGNOSIS — I10 HYPERTENSION, UNSPECIFIED TYPE: Primary | ICD-10-CM

## 2025-06-05 DIAGNOSIS — Z91.89 FRAMINGHAM CARDIAC RISK 10-20% IN NEXT 10 YEARS: ICD-10-CM

## 2025-06-05 DIAGNOSIS — E78.00 HYPERCHOLESTEROLEMIA: ICD-10-CM

## 2025-06-05 DIAGNOSIS — Z13.6 SCREENING FOR CARDIOVASCULAR CONDITION: ICD-10-CM

## 2025-06-05 DIAGNOSIS — I10 PRIMARY HYPERTENSION: ICD-10-CM

## 2025-06-05 DIAGNOSIS — Z30.09 FAMILY PLANNING: ICD-10-CM

## 2025-06-05 DIAGNOSIS — E66.01 MORBID OBESITY (H): ICD-10-CM

## 2025-06-05 PROBLEM — E66.3 OVERWEIGHT: Status: RESOLVED | Noted: 2017-09-20 | Resolved: 2025-06-05

## 2025-06-05 LAB
ANION GAP SERPL CALCULATED.3IONS-SCNC: 13 MMOL/L (ref 7–15)
BUN SERPL-MCNC: 12.2 MG/DL (ref 6–20)
CALCIUM SERPL-MCNC: 9.3 MG/DL (ref 8.8–10.4)
CHLORIDE SERPL-SCNC: 97 MMOL/L (ref 98–107)
CHOLEST SERPL-MCNC: 158 MG/DL
CREAT SERPL-MCNC: 1.05 MG/DL (ref 0.67–1.17)
EGFRCR SERPLBLD CKD-EPI 2021: 88 ML/MIN/1.73M2
FASTING STATUS PATIENT QL REPORTED: NO
FASTING STATUS PATIENT QL REPORTED: NO
GLUCOSE SERPL-MCNC: 91 MG/DL (ref 70–99)
HCO3 SERPL-SCNC: 26 MMOL/L (ref 22–29)
HDLC SERPL-MCNC: 46 MG/DL
LDLC SERPL CALC-MCNC: 81 MG/DL
NONHDLC SERPL-MCNC: 112 MG/DL
POTASSIUM SERPL-SCNC: 3.7 MMOL/L (ref 3.4–5.3)
SODIUM SERPL-SCNC: 136 MMOL/L (ref 135–145)
TRIGL SERPL-MCNC: 157 MG/DL

## 2025-06-05 PROCEDURE — 82565 ASSAY OF CREATININE: CPT | Mod: ZL | Performed by: FAMILY MEDICINE

## 2025-06-05 PROCEDURE — 80061 LIPID PANEL: CPT | Mod: ZL | Performed by: FAMILY MEDICINE

## 2025-06-05 PROCEDURE — 36415 COLL VENOUS BLD VENIPUNCTURE: CPT | Mod: ZL | Performed by: FAMILY MEDICINE

## 2025-06-05 RX ORDER — AMLODIPINE BESYLATE 10 MG/1
10 TABLET ORAL DAILY
Qty: 90 TABLET | Refills: 4 | Status: SHIPPED | OUTPATIENT
Start: 2025-06-05

## 2025-06-05 RX ORDER — ATORVASTATIN CALCIUM 40 MG/1
40 TABLET, FILM COATED ORAL DAILY
Qty: 90 TABLET | Refills: 4 | Status: SHIPPED | OUTPATIENT
Start: 2025-06-05

## 2025-06-05 RX ORDER — LISINOPRIL AND HYDROCHLOROTHIAZIDE 12.5; 2 MG/1; MG/1
1 TABLET ORAL 2 TIMES DAILY
Qty: 270 TABLET | Refills: 4 | Status: SHIPPED | OUTPATIENT
Start: 2025-06-05

## 2025-06-05 SDOH — HEALTH STABILITY: PHYSICAL HEALTH: ON AVERAGE, HOW MANY DAYS PER WEEK DO YOU ENGAGE IN MODERATE TO STRENUOUS EXERCISE (LIKE A BRISK WALK)?: 4 DAYS

## 2025-06-05 ASSESSMENT — SOCIAL DETERMINANTS OF HEALTH (SDOH): HOW OFTEN DO YOU GET TOGETHER WITH FRIENDS OR RELATIVES?: ONCE A WEEK

## 2025-06-05 ASSESSMENT — PAIN SCALES - GENERAL: PAINLEVEL_OUTOF10: NO PAIN (0)

## 2025-06-05 NOTE — NURSING NOTE
"Chief Complaint   Patient presents with    Wellness Visit    Medication Request     GLP-1 for weight loss     Referral     Vasectomy        Initial BP (!) 146/98 (BP Location: Right arm, Patient Position: Sitting, Cuff Size: Adult Large)   Pulse 72   Temp 97  F (36.1  C) (Temporal)   Resp 16   Ht 1.791 m (5' 10.5\")   Wt 134.9 kg (297 lb 6.4 oz)   SpO2 98%   BMI 42.07 kg/m   Estimated body mass index is 42.07 kg/m  as calculated from the following:    Height as of this encounter: 1.791 m (5' 10.5\").    Weight as of this encounter: 134.9 kg (297 lb 6.4 oz).  Medication Review: complete    The next two questions are to help us understand your food security.  If you are feeling you need any assistance in this area, we have resources available to support you today.          6/5/2025   SDOH- Food Insecurity   Within the past 12 months, did you worry that your food would run out before you got money to buy more? N   Within the past 12 months, did the food you bought just not last and you didn t have money to get more? N         Health Care Directive:  Patient does not have a Health Care Directive: Discussed advance care planning with patient; however, patient declined at this time.    Lolita Jordan LPN      "

## 2025-06-05 NOTE — PROGRESS NOTES
"Preventive Care Visit  [facility]  Valentin Killian MD, Family Medicine  Jun 5, 2025      Assessment & Plan     Hypertension, unspecified type  Currently under good control  - BASIC METABOLIC PANEL; Future  - amLODIPine (NORVASC) 10 MG tablet; Take 1 tablet (10 mg) by mouth daily.  - lisinopril-hydrochlorothiazide (ZESTORETIC) 20-12.5 MG tablet; Take 1 tablet by mouth 2 times daily.  - BASIC METABOLIC PANEL    [place] cardiac risk 10-20% in next 10 years  Medication refill  - atorvastatin (LIPITOR) 40 MG tablet; Take 1 tablet (40 mg) by mouth daily.        Hypercholesterolemia    - Lipid Profile; Future  - Lipid Profile    Family planning  For vasectomy consult  - Adult Urology  Referral; Future      Morbid obesity (H)  Will start Ozempic.  Patient is advised to follow-up with the shot nurse if he has difficulty with the injections.  Follow-up in 1 month.  - semaglutide (OZEMPIC) 2 MG/3ML pen; Inject 0.25 mg subcutaneously every 7 days.            BMI  Estimated body mass index is 42.07 kg/m  as calculated from the following:    Height as of this encounter: 1.791 m (5' 10.5\").    Weight as of this encounter: 134.9 kg (297 lb 6.4 oz).       Counseling  Appropriate preventive services were addressed with this patient via screening, questionnaire, or discussion as appropriate for fall prevention, nutrition, physical activity, Tobacco-use cessation, social engagement, weight loss and cognition.  Checklist reviewing preventive services available has been given to the patient.  Reviewed patient's diet, addressing concerns and/or questions.   The patient reports drinking more than 3 alcoholic drinks per day and/or more than 7 drhnks per week. The patient was counseled and given information about possible harmful effects of excessive alcohol intake.        Antony Sparrow is a 49 year old, presenting for the following:  Wellness Visit, Medication Request (GLP-1 for weight loss ), and " Referral (Vasectomy )        6/5/2025     2:55 PM   Additional Questions   Roomed by Lolita SUMMERS          History of Present Illness       Hyperlipidemia:  He presents for follow up of hyperlipidemia.   He is taking medication to lower cholesterol. He is not having myalgia or other side effects to statin medications.He exercises with enough effort to increase his heart rate 30 to 60 minutes per day.  He exercises with enough effort to increase his heart rate 4 days per week. He is missing 2 dose(s) of medications per week.  He is not taking prescribed medications regularly due to remembering to take.    Patient does have request to see urology for vasectomy.  He has 1 daughter going to school.  Another graduation.  He also requests to discuss GLP-1.  He is never been on it in the past.  He is tried losing weight through diet.  Been unsuccessful.         Advance Care Planning    Discussed advance care planning with patient; however, patient declined at this time.        6/5/2025   General Health   How would you rate your overall physical health? Good   Feel stress (tense, anxious, or unable to sleep) Only a little   (!) STRESS CONCERN      6/5/2025   Nutrition   Three or more servings of calcium each day? Yes   Diet: Regular (no restrictions)   How many servings of fruit and vegetables per day? (!) 2-3   How many sweetened beverages each day? (!) 3         6/5/2025   Exercise   Days per week of moderate/strenous exercise 4 days         6/5/2025   Social Factors   Frequency of gathering with friends or relatives Once a week   Worry food won't last until get money to buy more No   Food not last or not have enough money for food? No   Do you have housing? (Housing is defined as stable permanent housing and does not include staying outside in a car, in a tent, in an abandoned building, in an overnight shelter, or couch-surfing.) Yes   Are you worried about losing your housing? No   Lack of transportation? No   Unable to  get utilities (heat,electricity)? No         6/5/2025   Dental   Dentist two times every year? Yes         Today's PHQ-2 Score:       6/5/2025     2:53 PM   PHQ-2 ( 1999 [employer])   Q1: Little interest or pleasure in doing things 0   Q2: Feeling down, depressed or hopeless 0   PHQ-2 Score 0    Q1: Little interest or pleasure in doing things Not at all   Q2: Feeling down, depressed or hopeless Not at all   PHQ-2 Score 0       Patient-reported           6/5/2025   Substance Use   Alcohol more than 3/day or more than 7/wk Yes   How often do you have a drink containing alcohol 2 to 3 times a week   How many alcohol drinks on typical day 3 or 4   How often do you have 5+ drinks at one occasion Monthly   Audit 2/3 Score 3   How often not able to stop drinking once started Never   How often failed to do what normally expected Never   How often needed first drink in am after a heavy drinking session Never   How often feeling of guilt or remorse after drinking Never   How often unable to remember what happened the night before Never   Have you or someone else been injured because of your drinking No   Has anyone been concerned or suggested you cut down on drinking No   TOTAL SCORE - AUDIT 6   Do you use any other substances recreationally? No     Social History     Tobacco Use    Smoking status: Former     Current packs/day: 0.25     Average packs/day: 0.3 packs/day for 20.4 years (5.1 ttl pk-yrs)     Types: Cigarettes     Start date: 1/1/2005    Smokeless tobacco: Never   Vaping Use    Vaping status: Never Used   Substance Use Topics    Alcohol use: Yes     Comment: 3 days a week    Drug use: Never     Types: Other     Comment: Drug use: No           6/5/2025   STI Screening   New sexual partner(s) since last STI/HIV test? No   ASCVD Risk   The 10-year ASCVD risk score (Mckenzie FONTANA, et al., 2019) is: 8.7%    Values used to calculate the score:      Age: 49 years      Sex: Male      Is Non- :  "Yes      Diabetic: No      Tobacco smoker: No      Systolic Blood Pressure: 146 mmHg      Is BP treated: Yes      HDL Cholesterol: 48 mg/dL      Total Cholesterol: 137 mg/dL        6/5/2025   Contraception/Family Planning   Questions about contraception or family planning (!) YES Referral for a Vasectomy         Reviewed and updated as needed this visit by Provider                             Objective    Exam  BP (!) 146/98 (BP Location: Right arm, Patient Position: Sitting, Cuff Size: Adult Large)   Pulse 72   Temp 97  F (36.1  C) (Temporal)   Resp 16   Ht 1.791 m (5' 10.5\")   Wt 134.9 kg (297 lb 6.4 oz)   SpO2 98%   BMI 42.07 kg/m     Estimated body mass index is 42.07 kg/m  as calculated from the following:    Height as of this encounter: 1.791 m (5' 10.5\").    Weight as of this encounter: 134.9 kg (297 lb 6.4 oz).    Physical Exam  GENERAL: alert and no distress  NECK: no adenopathy, no asymmetry, masses, or scars  RESP: lungs clear to auscultation - no rales, rhonchi or wheezes  CV: regular rate and rhythm, normal S1 S2, no S3 or S4, no murmur, click or rub, no peripheral edema  ABDOMEN: soft, nontender, no hepatosplenomegaly, no masses and bowel sounds normal  MS: no gross musculoskeletal defects noted, no edema        Signed Electronically by: Valentin Killian MD    "

## 2025-06-10 ENCOUNTER — RESULTS FOLLOW-UP (OUTPATIENT)
Dept: FAMILY MEDICINE | Facility: OTHER | Age: 50
End: 2025-06-10

## 2025-06-17 ENCOUNTER — VIRTUAL VISIT (OUTPATIENT)
Dept: UROLOGY | Facility: OTHER | Age: 50
End: 2025-06-17
Attending: FAMILY MEDICINE
Payer: COMMERCIAL

## 2025-06-17 VITALS
RESPIRATION RATE: 16 BRPM | OXYGEN SATURATION: 97 % | WEIGHT: 294.2 LBS | SYSTOLIC BLOOD PRESSURE: 132 MMHG | HEART RATE: 97 BPM | TEMPERATURE: 97.5 F | BODY MASS INDEX: 41.62 KG/M2 | DIASTOLIC BLOOD PRESSURE: 80 MMHG

## 2025-06-17 DIAGNOSIS — Z12.5 SCREENING FOR PROSTATE CANCER: Primary | ICD-10-CM

## 2025-06-17 DIAGNOSIS — Z30.09 ENCOUNTER FOR VASECTOMY COUNSELING: ICD-10-CM

## 2025-06-17 LAB — PSA SERPL DL<=0.01 NG/ML-MCNC: 0.61 NG/ML (ref 0–2.5)

## 2025-06-17 PROCEDURE — 36415 COLL VENOUS BLD VENIPUNCTURE: CPT | Mod: ZL | Performed by: UROLOGY

## 2025-06-17 PROCEDURE — G0103 PSA SCREENING: HCPCS | Mod: ZL | Performed by: UROLOGY

## 2025-06-17 ASSESSMENT — PAIN SCALES - GENERAL: PAINLEVEL_OUTOF10: NO PAIN (0)

## 2025-06-17 NOTE — NURSING NOTE
"Chief Complaint   Patient presents with    Consult     Vasectomy    Patient presents to the clinic today for a consult for a vasectomy    Initial There were no vitals taken for this visit. Estimated body mass index is 42.07 kg/m  as calculated from the following:    Height as of 6/5/25: 1.791 m (5' 10.5\").    Weight as of 6/5/25: 134.9 kg (297 lb 6.4 oz).  Meds Reconciled: complete      Yarelis Hyde LPN,LPN on 6/17/2025 at 3:21 PM  Ext. [phone]        Yarelis Hyde LPN  "

## 2025-06-17 NOTE — PROGRESS NOTES
Type of service:  Video Visit   Video Visit Start Time: 3:30  Video Visit End Time: 3:50    Originating Location (pt. Location): [facility]  Distant Location (provider location): [place], [place]  Platform used for Video Visit: Epic Virtual Visit Platform    I have reviewed the note as documented above.  This accurately captures the substance of my virtual visit with the patient. The patient states an understanding and is agreeable with the plan.   Jose Angel Hauser MD   Urology     It was my pleasure to meet Mr. Andrews Jansen, a 49 year old year old male seen in consultation today for Chief Complaint: Consult (Vasectomy )  .    HPI: Mr. Andrews Jansen is a 49 year old year old male who presents today June 17, 2025 virtually for vasectomy counseling.    No blood thinner use.  No previous scrotal or inguinal surgery.  He desires infertility.    No past medical history on file.    No past surgical history on file.      SOCIAL HISTORY:   reports that he has quit smoking. His smoking use included cigarettes. He started smoking about 20 years ago. He has a 5.1 pack-year smoking history. He has never used smokeless tobacco.    Current Outpatient Medications   Medication Sig Dispense Refill    amLODIPine (NORVASC) 10 MG tablet Take 1 tablet (10 mg) by mouth daily. 90 tablet 4    atorvastatin (LIPITOR) 40 MG tablet Take 1 tablet (40 mg) by mouth daily. 90 tablet 4    lisinopril-hydrochlorothiazide (ZESTORETIC) 20-12.5 MG tablet Take 1 tablet by mouth 2 times daily. 270 tablet 4    semaglutide (OZEMPIC) 2 MG/3ML pen Inject 0.25 mg subcutaneously every 7 days. 3 mL 1       ALLERGIES: Patient has no known allergies.      GENERAL PHYSICAL EXAM:   Vitals: There were no vitals taken for this visit.  There is no height or weight on file to calculate BMI.    GENERAL: Well groomed, well developed, well nourished male in NAD.  HEAD: Normocephalic.   ENT: No jaundice   RESPIRATORY: Normal respiratory effort.    MS:  "Visibly moving UE well.   NEURO: Alert and oriented x 3.  PSYCH: Normal mood and affect, pleasant and agreeable during interview and exam.    LABS: The last test results for Ms. Andrews Jansen were reviewed.   No results found for this or any previous visit (from the past 24 hours).    PSA - No results found for: \"PSA\"  BMP -   Recent Labs   Lab Test 06/05/25  1540 05/10/24  1404 01/19/23  1619    135 135*   POTASSIUM 3.7 3.8 3.9   CHLORIDE 97* 96* 97*   CO2 26 27 27   BUN 12.2 7.0 10.6   CR 1.05 1.02 1.18*   GLC 91 95 85   SUYAPA 9.3 9.6 9.3       CBC - No lab results found.    ASSESSMENT:   Encounter for vasectomy counseling    PLAN:   Discussion with patient about the procedure and the technique used.  Explained to the patient that vasectomy is intended to be a permanent form of contraception and may not be reversible. Risks explained including bleeding with hematoma formation of 1-2%, infection 1-2%, injury to testis with loss of testis, failure rate of 2% requiring repeat vasectomy, 1-2% risk of chronic pain which may require additional surgery to correct and a slight decrease in the volume of ejaculate.  Risk of pregnancy after vasectomy of 1 in 1500 for men who have post-vasectomy azoospermia or rare non-motile sperm explained to patient.      Spoke about after care including avoiding blood thinners such as aspirin, NSAIDS 10 days prior and 2 weeks after.  Patient advised to take it easy for a least a week with limited activity and relaxation.  Ice and elevation recommended and explained.  Good scrotal support recommended.  Patient advised to continue birth control until told he is sterile.  No sex for 1 week after procedure.  No soaking for 2 weeks and may shower lightly in 2 days.  Neosporin TID to incisions until healed.  Advised to call with fever chills or concerns.      Patient voiced an understanding.      15 minutes spent on the date of this encounter doing chart review, history and exam, " documentation and further activities as noted above.      Jose Angel Hauser MD   [facility] Urology

## 2025-06-18 ENCOUNTER — RESULTS FOLLOW-UP (OUTPATIENT)
Dept: UROLOGY | Facility: OTHER | Age: 50
End: 2025-06-18

## (undated) RX ORDER — HYDROCHLOROTHIAZIDE 25 MG/1
TABLET ORAL
Status: DISPENSED
Start: 2021-11-27

## (undated) RX ORDER — AMLODIPINE BESYLATE 5 MG/1
TABLET ORAL
Status: DISPENSED
Start: 2021-11-27

## (undated) RX ORDER — CLINDAMYCIN HCL 150 MG
CAPSULE ORAL
Status: DISPENSED
Start: 2021-11-27